# Patient Record
Sex: MALE | Race: WHITE
[De-identification: names, ages, dates, MRNs, and addresses within clinical notes are randomized per-mention and may not be internally consistent; named-entity substitution may affect disease eponyms.]

---

## 2018-02-26 ENCOUNTER — HOSPITAL ENCOUNTER (EMERGENCY)
Dept: HOSPITAL 100 - ED | Age: 48
Discharge: HOME | End: 2018-02-26
Payer: COMMERCIAL

## 2018-02-26 VITALS
DIASTOLIC BLOOD PRESSURE: 98 MMHG | SYSTOLIC BLOOD PRESSURE: 165 MMHG | HEART RATE: 107 BPM | OXYGEN SATURATION: 96 % | TEMPERATURE: 96.62 F | RESPIRATION RATE: 15 BRPM

## 2018-02-26 VITALS — BODY MASS INDEX: 32.3 KG/M2

## 2018-02-26 DIAGNOSIS — S61.411A: Primary | ICD-10-CM
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
## 2018-02-26 DIAGNOSIS — S66.329A: ICD-10-CM
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
## 2018-02-26 PROCEDURE — 99283 EMERGENCY DEPT VISIT LOW MDM: CPT

## 2018-02-26 PROCEDURE — 12001 RPR S/N/AX/GEN/TRNK 2.5CM/<: CPT

## 2018-02-26 PROCEDURE — 29125 APPL SHORT ARM SPLINT STATIC: CPT

## 2019-08-22 ENCOUNTER — HOSPITAL ENCOUNTER (OUTPATIENT)
Dept: HOSPITAL 100 - MFPLAB | Age: 49
Discharge: HOME | End: 2019-08-22
Payer: COMMERCIAL

## 2019-08-22 DIAGNOSIS — G47.30: ICD-10-CM

## 2019-08-22 DIAGNOSIS — E78.5: Primary | ICD-10-CM

## 2019-08-22 LAB
ALANINE AMINOTRANSFER ALT/SGPT: 50 U/L (ref 16–61)
ALBUMIN SERPL-MCNC: 3.8 G/DL (ref 3.2–5)
ALKALINE PHOSPHATASE: 72 U/L (ref 45–117)
ANION GAP: 4 (ref 5–15)
AST(SGOT): 42 U/L (ref 15–37)
BUN SERPL-MCNC: 17 MG/DL (ref 7–18)
BUN/CREAT RATIO: 16.8 RATIO (ref 10–20)
CALCIUM SERPL-MCNC: 8.3 MG/DL (ref 8.5–10.1)
CARBON DIOXIDE: 27 MMOL/L (ref 21–32)
CHLORIDE: 109 MMOL/L (ref 98–107)
CHOLEST SERPL-MCNC: 190 MG/DL
EST GLOM FILT RATE - AFR AMER: 101 ML/MIN (ref 60–?)
GLOBULIN: 3.1 G/DL (ref 2.2–4.2)
GLUCOSE: 94 MG/DL (ref 74–106)
POTASSIUM: 3.9 MMOL/L (ref 3.5–5.1)
TRIGLYCERIDES: 240 MG/DL
VITAMIN D,25 HYDROXY: 14.4 NG/ML (ref 29.95–100.01)
VLDLC SERPL-MCNC: 48 MG/DL (ref 5–40)

## 2019-08-22 PROCEDURE — 80053 COMPREHEN METABOLIC PANEL: CPT

## 2019-08-22 PROCEDURE — 36415 COLL VENOUS BLD VENIPUNCTURE: CPT

## 2019-08-22 PROCEDURE — 82306 VITAMIN D 25 HYDROXY: CPT

## 2019-08-22 PROCEDURE — 80061 LIPID PANEL: CPT

## 2020-04-09 ENCOUNTER — HOSPITAL ENCOUNTER (OUTPATIENT)
Age: 50
End: 2020-04-09
Payer: COMMERCIAL

## 2020-04-09 DIAGNOSIS — R10.12: Primary | ICD-10-CM

## 2020-04-09 LAB
ALANINE AMINOTRANSFER ALT/SGPT: 43 U/L (ref 16–61)
ALBUMIN SERPL-MCNC: 3.8 G/DL (ref 3.2–5)
ALKALINE PHOSPHATASE: 74 U/L (ref 45–117)
ANION GAP: 4 (ref 5–15)
AST(SGOT): 27 U/L (ref 15–37)
BUN SERPL-MCNC: 16 MG/DL (ref 7–18)
BUN/CREAT RATIO: 15.8 RATIO (ref 10–20)
CALCIUM SERPL-MCNC: 8.9 MG/DL (ref 8.5–10.1)
CARBON DIOXIDE: 29 MMOL/L (ref 21–32)
CHLORIDE: 106 MMOL/L (ref 98–107)
DEPRECATED RDW RBC: 41.1 FL (ref 35.1–43.9)
ERYTHROCYTE [DISTWIDTH] IN BLOOD: 13.2 % (ref 11.6–14.6)
EST GLOM FILT RATE - AFR AMER: 100 ML/MIN (ref 60–?)
GLOBULIN: 3.5 G/DL (ref 2.2–4.2)
GLUCOSE: 91 MG/DL (ref 74–106)
HCT VFR BLD AUTO: 44 % (ref 40–54)
HEMOGLOBIN: 14.2 G/DL (ref 13–16.5)
HGB BLD-MCNC: 14.2 G/DL (ref 13–16.5)
IMMATURE GRANULOCYTES COUNT: 0.01 X10^3/UL (ref 0–0)
LIPASE: 55 U/L (ref 73–393)
MCV RBC: 84.9 FL (ref 80–94)
MEAN CORP HGB CONC: 32.3 G/DL (ref 32–36)
MEAN PLATELET VOL.: 9.9 FL (ref 6.2–12)
NRBC FLAGGED BY ANALYZER: 0 % (ref 0–5)
PLATELET # BLD: 182 K/MM3 (ref 150–450)
PLATELET COUNT: 182 K/MM3 (ref 150–450)
POTASSIUM: 3.9 MMOL/L (ref 3.5–5.1)
RBC # BLD AUTO: 5.18 M/MM3 (ref 4.6–6.2)
RBC DISTRIBUTION WIDTH CV: 13.2 % (ref 11.6–14.6)
RBC DISTRIBUTION WIDTH SD: 41.1 FL (ref 35.1–43.9)
WBC # BLD AUTO: 6.1 K/MM3 (ref 4.4–11)
WHITE BLOOD COUNT: 6.1 K/MM3 (ref 4.4–11)

## 2020-04-09 PROCEDURE — 85025 COMPLETE CBC W/AUTO DIFF WBC: CPT

## 2020-04-09 PROCEDURE — 83690 ASSAY OF LIPASE: CPT

## 2020-04-09 PROCEDURE — 74160 CT ABDOMEN W/CONTRAST: CPT

## 2020-04-09 PROCEDURE — 80053 COMPREHEN METABOLIC PANEL: CPT

## 2020-04-09 PROCEDURE — 36415 COLL VENOUS BLD VENIPUNCTURE: CPT

## 2020-11-18 ENCOUNTER — HOSPITAL ENCOUNTER (OUTPATIENT)
Age: 50
Discharge: HOME | End: 2020-11-18
Payer: COMMERCIAL

## 2020-11-18 VITALS — BODY MASS INDEX: 32.3 KG/M2

## 2020-11-18 DIAGNOSIS — Z20.828: Primary | ICD-10-CM

## 2020-11-18 PROCEDURE — 87635 SARS-COV-2 COVID-19 AMP PRB: CPT

## 2020-11-18 PROCEDURE — U0003 INFECTIOUS AGENT DETECTION BY NUCLEIC ACID (DNA OR RNA); SEVERE ACUTE RESPIRATORY SYNDROME CORONAVIRUS 2 (SARS-COV-2) (CORONAVIRUS DISEASE [COVID-19]), AMPLIFIED PROBE TECHNIQUE, MAKING USE OF HIGH THROUGHPUT TECHNOLOGIES AS DESCRIBED BY CMS-2020-01-R: HCPCS

## 2020-12-17 ENCOUNTER — HOSPITAL ENCOUNTER (OUTPATIENT)
Age: 50
End: 2020-12-17
Payer: COMMERCIAL

## 2020-12-17 VITALS — BODY MASS INDEX: 32.3 KG/M2

## 2020-12-17 DIAGNOSIS — E55.9: Primary | ICD-10-CM

## 2020-12-17 DIAGNOSIS — E78.5: ICD-10-CM

## 2020-12-17 LAB
ANION GAP: 6 (ref 5–15)
BUN SERPL-MCNC: 18 MG/DL (ref 7–18)
BUN/CREAT RATIO: 18 RATIO (ref 10–20)
CALCIUM SERPL-MCNC: 8.6 MG/DL (ref 8.5–10.1)
CARBON DIOXIDE: 29 MMOL/L (ref 21–32)
CHLORIDE: 103 MMOL/L (ref 98–107)
CHOLEST SERPL-MCNC: 190 MG/DL
EST GLOM FILT RATE - AFR AMER: 101 ML/MIN (ref 60–?)
GLUCOSE: 86 MG/DL (ref 74–106)
POTASSIUM: 4.2 MMOL/L (ref 3.5–5.1)
TRIGLYCERIDES: 166 MG/DL
VITAMIN D,25 HYDROXY: 18.1 NG/ML
VLDLC SERPL-MCNC: 33 MG/DL (ref 5–40)

## 2020-12-17 PROCEDURE — 80048 BASIC METABOLIC PNL TOTAL CA: CPT

## 2020-12-17 PROCEDURE — 36415 COLL VENOUS BLD VENIPUNCTURE: CPT

## 2020-12-17 PROCEDURE — 82306 VITAMIN D 25 HYDROXY: CPT

## 2020-12-17 PROCEDURE — 80061 LIPID PANEL: CPT

## 2021-07-16 ENCOUNTER — HOSPITAL ENCOUNTER (EMERGENCY)
Age: 51
Discharge: ANOTHER HOSPITAL | End: 2021-07-16
Attending: EMERGENCY MEDICINE
Payer: OTHER MISCELLANEOUS

## 2021-07-16 ENCOUNTER — APPOINTMENT (OUTPATIENT)
Dept: CT IMAGING | Age: 51
End: 2021-07-16
Payer: OTHER MISCELLANEOUS

## 2021-07-16 ENCOUNTER — HOSPITAL ENCOUNTER (INPATIENT)
Age: 51
LOS: 1 days | Discharge: HOME | DRG: 086 | End: 2021-07-17
Attending: SURGERY | Admitting: SURGERY
Payer: COMMERCIAL

## 2021-07-16 ENCOUNTER — APPOINTMENT (OUTPATIENT)
Dept: CT IMAGING | Age: 51
DRG: 086 | End: 2021-07-16
Attending: SURGERY
Payer: COMMERCIAL

## 2021-07-16 ENCOUNTER — APPOINTMENT (OUTPATIENT)
Dept: GENERAL RADIOLOGY | Age: 51
End: 2021-07-16
Payer: OTHER MISCELLANEOUS

## 2021-07-16 ENCOUNTER — APPOINTMENT (OUTPATIENT)
Dept: GENERAL RADIOLOGY | Age: 51
DRG: 086 | End: 2021-07-16
Attending: SURGERY
Payer: COMMERCIAL

## 2021-07-16 VITALS
HEART RATE: 83 BPM | BODY MASS INDEX: 34.3 KG/M2 | HEIGHT: 71 IN | TEMPERATURE: 99.3 F | SYSTOLIC BLOOD PRESSURE: 126 MMHG | OXYGEN SATURATION: 94 % | WEIGHT: 245 LBS | DIASTOLIC BLOOD PRESSURE: 83 MMHG | RESPIRATION RATE: 16 BRPM

## 2021-07-16 DIAGNOSIS — M25.561 ACUTE PAIN OF RIGHT KNEE: ICD-10-CM

## 2021-07-16 DIAGNOSIS — S20.211A CONTUSION OF RIGHT CHEST WALL, INITIAL ENCOUNTER: ICD-10-CM
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
## 2021-07-16 DIAGNOSIS — I62.9 ACUTE INTRA-CRANIAL HEMORRHAGE (HCC): ICD-10-CM

## 2021-07-16 PROBLEM — I61.9 INTRAPARENCHYMAL HEMORRHAGE OF BRAIN (HCC): Status: ACTIVE | Noted: 2021-07-16

## 2021-07-16 LAB
ALBUMIN SERPL-MCNC: 4.9 G/DL (ref 3.5–4.6)
ALP BLD-CCNC: 72 U/L (ref 35–104)
ALT SERPL-CCNC: 45 U/L (ref 0–41)
ANION GAP SERPL CALCULATED.3IONS-SCNC: 11 MEQ/L (ref 9–15)
AST SERPL-CCNC: 49 U/L (ref 0–40)
BASOPHILS ABSOLUTE: 0 K/UL (ref 0–0.1)
BASOPHILS RELATIVE PERCENT: 0.2 % (ref 0.2–1.2)
BILIRUB SERPL-MCNC: 0.6 MG/DL (ref 0.2–0.7)
BUN BLDV-MCNC: 14 MG/DL (ref 6–20)
CALCIUM SERPL-MCNC: 9.1 MG/DL (ref 8.5–9.9)
CHLORIDE BLD-SCNC: 101 MEQ/L (ref 95–107)
CO2: 25 MEQ/L (ref 20–31)
CREAT SERPL-MCNC: 0.59 MG/DL (ref 0.7–1.2)
EOSINOPHILS ABSOLUTE: 0 K/UL (ref 0–0.5)
EOSINOPHILS RELATIVE PERCENT: 0.2 % (ref 0.8–7)
GFR AFRICAN AMERICAN: >60
GFR NON-AFRICAN AMERICAN: >60
GLOBULIN: 2.7 G/DL (ref 2.3–3.5)
GLUCOSE BLD-MCNC: 122 MG/DL (ref 70–99)
HCT VFR BLD CALC: 42.9 % (ref 42–52)
HEMOGLOBIN: 14.1 G/DL (ref 13.7–17.5)
IMMATURE GRANULOCYTES #: 0 K/UL
IMMATURE GRANULOCYTES %: 0.4 %
INR BLD: 1
LYMPHOCYTES ABSOLUTE: 0.9 K/UL (ref 1.3–3.6)
LYMPHOCYTES RELATIVE PERCENT: 9.2 %
MCH RBC QN AUTO: 28 PG (ref 25.7–32.2)
MCHC RBC AUTO-ENTMCNC: 32.9 % (ref 32.3–36.5)
MCV RBC AUTO: 85.1 FL (ref 79–92.2)
MONOCYTES ABSOLUTE: 0.9 K/UL (ref 0.3–0.8)
MONOCYTES RELATIVE PERCENT: 9 % (ref 5.3–12.2)
NEUTROPHILS ABSOLUTE: 7.9 K/UL (ref 1.8–5.4)
NEUTROPHILS RELATIVE PERCENT: 81 % (ref 34–67.9)
PDW BLD-RTO: 13.3 % (ref 11.6–14.4)
PLATELET # BLD: 155 K/UL (ref 163–337)
POTASSIUM SERPL-SCNC: 4.2 MEQ/L (ref 3.4–4.9)
PROTHROMBIN TIME: 13.3 SEC (ref 12.3–14.9)
RBC # BLD: 5.04 M/UL (ref 4.63–6.08)
SODIUM BLD-SCNC: 137 MEQ/L (ref 135–144)
TOTAL PROTEIN: 7.6 G/DL (ref 6.3–8)
TROPONIN: <0.01 NG/ML (ref 0–0.01)
WBC # BLD: 9.8 K/UL (ref 4.2–9)

## 2021-07-16 PROCEDURE — 6370000000 HC RX 637 (ALT 250 FOR IP): Performed by: EMERGENCY MEDICINE

## 2021-07-16 PROCEDURE — 85025 COMPLETE CBC W/AUTO DIFF WBC: CPT

## 2021-07-16 PROCEDURE — 71046 X-RAY EXAM CHEST 2 VIEWS: CPT

## 2021-07-16 PROCEDURE — 2580000003 HC RX 258: Performed by: PHYSICIAN ASSISTANT

## 2021-07-16 PROCEDURE — 2000000000 HC ICU R&B

## 2021-07-16 PROCEDURE — 96125 COGNITIVE TEST BY HC PRO: CPT

## 2021-07-16 PROCEDURE — 73562 X-RAY EXAM OF KNEE 3: CPT

## 2021-07-16 PROCEDURE — 71250 CT THORAX DX C-: CPT

## 2021-07-16 PROCEDURE — 99285 EMERGENCY DEPT VISIT HI MDM: CPT

## 2021-07-16 PROCEDURE — 94150 VITAL CAPACITY TEST: CPT

## 2021-07-16 PROCEDURE — 72125 CT NECK SPINE W/O DYE: CPT

## 2021-07-16 PROCEDURE — 36415 COLL VENOUS BLD VENIPUNCTURE: CPT

## 2021-07-16 PROCEDURE — 94660 CPAP INITIATION&MGMT: CPT

## 2021-07-16 PROCEDURE — 93005 ELECTROCARDIOGRAM TRACING: CPT | Performed by: PHYSICIAN ASSISTANT

## 2021-07-16 PROCEDURE — 73630 X-RAY EXAM OF FOOT: CPT

## 2021-07-16 PROCEDURE — 84484 ASSAY OF TROPONIN QUANT: CPT

## 2021-07-16 PROCEDURE — 85610 PROTHROMBIN TIME: CPT

## 2021-07-16 PROCEDURE — 80053 COMPREHEN METABOLIC PANEL: CPT

## 2021-07-16 PROCEDURE — APPSS30 APP SPLIT SHARED TIME 16-30 MINUTES: Performed by: PHYSICIAN ASSISTANT

## 2021-07-16 PROCEDURE — 70450 CT HEAD/BRAIN W/O DYE: CPT

## 2021-07-16 PROCEDURE — 6370000000 HC RX 637 (ALT 250 FOR IP): Performed by: PHYSICIAN ASSISTANT

## 2021-07-16 PROCEDURE — 2500000003 HC RX 250 WO HCPCS: Performed by: EMERGENCY MEDICINE

## 2021-07-16 PROCEDURE — 96374 THER/PROPH/DIAG INJ IV PUSH: CPT

## 2021-07-16 RX ORDER — SODIUM CHLORIDE 0.9 % (FLUSH) 0.9 %
5-40 SYRINGE (ML) INJECTION PRN
Status: DISCONTINUED | OUTPATIENT
Start: 2021-07-16 | End: 2021-07-17 | Stop reason: HOSPADM

## 2021-07-16 RX ORDER — PAROXETINE 10 MG/1
10 TABLET, FILM COATED ORAL EVERY MORNING
COMMUNITY

## 2021-07-16 RX ORDER — SODIUM CHLORIDE 9 MG/ML
INJECTION, SOLUTION INTRAVENOUS CONTINUOUS
Status: DISCONTINUED | OUTPATIENT
Start: 2021-07-16 | End: 2021-07-16

## 2021-07-16 RX ORDER — ONDANSETRON 4 MG/1
4 TABLET, ORALLY DISINTEGRATING ORAL EVERY 8 HOURS PRN
Status: DISCONTINUED | OUTPATIENT
Start: 2021-07-16 | End: 2021-07-17 | Stop reason: HOSPADM

## 2021-07-16 RX ORDER — LEVETIRACETAM 250 MG/1
750 TABLET ORAL 2 TIMES DAILY
Status: DISCONTINUED | OUTPATIENT
Start: 2021-07-16 | End: 2021-07-17 | Stop reason: HOSPADM

## 2021-07-16 RX ORDER — ONDANSETRON 2 MG/ML
4 INJECTION INTRAMUSCULAR; INTRAVENOUS EVERY 6 HOURS PRN
Status: DISCONTINUED | OUTPATIENT
Start: 2021-07-16 | End: 2021-07-17 | Stop reason: HOSPADM

## 2021-07-16 RX ORDER — ACETAMINOPHEN 325 MG/1
650 TABLET ORAL EVERY 6 HOURS
Status: DISCONTINUED | OUTPATIENT
Start: 2021-07-16 | End: 2021-07-17 | Stop reason: HOSPADM

## 2021-07-16 RX ORDER — PAROXETINE 10 MG/1
10 TABLET, FILM COATED ORAL EVERY MORNING
Status: DISCONTINUED | OUTPATIENT
Start: 2021-07-16 | End: 2021-07-17 | Stop reason: HOSPADM

## 2021-07-16 RX ORDER — LISINOPRIL 10 MG/1
10 TABLET ORAL DAILY
COMMUNITY

## 2021-07-16 RX ORDER — LISINOPRIL 10 MG/1
10 TABLET ORAL DAILY
Status: DISCONTINUED | OUTPATIENT
Start: 2021-07-16 | End: 2021-07-17 | Stop reason: HOSPADM

## 2021-07-16 RX ORDER — POLYETHYLENE GLYCOL 3350 17 G/17G
17 POWDER, FOR SOLUTION ORAL DAILY
Status: DISCONTINUED | OUTPATIENT
Start: 2021-07-16 | End: 2021-07-17 | Stop reason: HOSPADM

## 2021-07-16 RX ORDER — DIAPER,BRIEF,INFANT-TODD,DISP
EACH MISCELLANEOUS ONCE
Status: DISCONTINUED | OUTPATIENT
Start: 2021-07-16 | End: 2021-07-16

## 2021-07-16 RX ORDER — OXYCODONE HYDROCHLORIDE 5 MG/1
5 TABLET ORAL EVERY 4 HOURS PRN
Status: DISCONTINUED | OUTPATIENT
Start: 2021-07-16 | End: 2021-07-17 | Stop reason: HOSPADM

## 2021-07-16 RX ORDER — SODIUM CHLORIDE 0.9 % (FLUSH) 0.9 %
5-40 SYRINGE (ML) INJECTION EVERY 12 HOURS SCHEDULED
Status: DISCONTINUED | OUTPATIENT
Start: 2021-07-16 | End: 2021-07-17 | Stop reason: HOSPADM

## 2021-07-16 RX ORDER — ROSUVASTATIN CALCIUM 10 MG/1
10 TABLET, COATED ORAL DAILY
COMMUNITY

## 2021-07-16 RX ORDER — SODIUM PHOSPHATE, DIBASIC AND SODIUM PHOSPHATE, MONOBASIC 7; 19 G/133ML; G/133ML
1 ENEMA RECTAL DAILY PRN
Status: DISCONTINUED | OUTPATIENT
Start: 2021-07-16 | End: 2021-07-17 | Stop reason: HOSPADM

## 2021-07-16 RX ORDER — TRAMADOL HYDROCHLORIDE 50 MG/1
50 TABLET ORAL ONCE
Status: COMPLETED | OUTPATIENT
Start: 2021-07-16 | End: 2021-07-16

## 2021-07-16 RX ORDER — MAGNESIUM SULFATE IN WATER 40 MG/ML
2000 INJECTION, SOLUTION INTRAVENOUS PRN
Status: DISCONTINUED | OUTPATIENT
Start: 2021-07-16 | End: 2021-07-17 | Stop reason: HOSPADM

## 2021-07-16 RX ORDER — BISACODYL 10 MG
10 SUPPOSITORY, RECTAL RECTAL DAILY
Status: DISCONTINUED | OUTPATIENT
Start: 2021-07-16 | End: 2021-07-17 | Stop reason: HOSPADM

## 2021-07-16 RX ORDER — LABETALOL HYDROCHLORIDE 5 MG/ML
10 INJECTION, SOLUTION INTRAVENOUS EVERY 4 HOURS PRN
Status: DISCONTINUED | OUTPATIENT
Start: 2021-07-16 | End: 2021-07-17 | Stop reason: HOSPADM

## 2021-07-16 RX ORDER — SODIUM CHLORIDE 9 MG/ML
25 INJECTION, SOLUTION INTRAVENOUS PRN
Status: DISCONTINUED | OUTPATIENT
Start: 2021-07-16 | End: 2021-07-17 | Stop reason: HOSPADM

## 2021-07-16 RX ORDER — LABETALOL HYDROCHLORIDE 5 MG/ML
5 INJECTION, SOLUTION INTRAVENOUS ONCE
Status: COMPLETED | OUTPATIENT
Start: 2021-07-16 | End: 2021-07-16

## 2021-07-16 RX ORDER — POTASSIUM CHLORIDE 7.45 MG/ML
10 INJECTION INTRAVENOUS PRN
Status: DISCONTINUED | OUTPATIENT
Start: 2021-07-16 | End: 2021-07-17 | Stop reason: HOSPADM

## 2021-07-16 RX ADMIN — PAROXETINE 10 MG: 10 TABLET, FILM COATED ORAL at 14:13

## 2021-07-16 RX ADMIN — ACETAMINOPHEN 650 MG: 325 TABLET ORAL at 13:37

## 2021-07-16 RX ADMIN — SODIUM CHLORIDE, PRESERVATIVE FREE 10 ML: 5 INJECTION INTRAVENOUS at 20:00

## 2021-07-16 RX ADMIN — LISINOPRIL 10 MG: 10 TABLET ORAL at 13:38

## 2021-07-16 RX ADMIN — POLYETHYLENE GLYCOL 3350 17 G: 17 POWDER, FOR SOLUTION ORAL at 13:37

## 2021-07-16 RX ADMIN — TRAMADOL HYDROCHLORIDE 50 MG: 50 TABLET, FILM COATED ORAL at 09:52

## 2021-07-16 RX ADMIN — SODIUM CHLORIDE: 9 INJECTION, SOLUTION INTRAVENOUS at 13:38

## 2021-07-16 RX ADMIN — LEVETIRACETAM 750 MG: 250 TABLET ORAL at 20:00

## 2021-07-16 RX ADMIN — ACETAMINOPHEN 650 MG: 325 TABLET ORAL at 19:53

## 2021-07-16 RX ADMIN — LABETALOL HYDROCHLORIDE 5 MG: 5 INJECTION, SOLUTION INTRAVENOUS at 10:52

## 2021-07-16 RX ADMIN — OXYCODONE 5 MG: 5 TABLET ORAL at 13:38

## 2021-07-16 RX ADMIN — OXYCODONE 5 MG: 5 TABLET ORAL at 19:53

## 2021-07-16 ASSESSMENT — PAIN SCALES - GENERAL
PAINLEVEL_OUTOF10: 2
PAINLEVEL_OUTOF10: 6
PAINLEVEL_OUTOF10: 2
PAINLEVEL_OUTOF10: 7
PAINLEVEL_OUTOF10: 4
PAINLEVEL_OUTOF10: 6
PAINLEVEL_OUTOF10: 6
PAINLEVEL_OUTOF10: 4
PAINLEVEL_OUTOF10: 2

## 2021-07-16 ASSESSMENT — PAIN DESCRIPTION - FREQUENCY
FREQUENCY: CONTINUOUS

## 2021-07-16 ASSESSMENT — ENCOUNTER SYMPTOMS
FACIAL SWELLING: 0
BACK PAIN: 0
TROUBLE SWALLOWING: 0
EYE DISCHARGE: 0
SHORTNESS OF BREATH: 0
STRIDOR: 0
CHOKING: 0
EYE PAIN: 0
VOICE CHANGE: 0
CHEST TIGHTNESS: 0
COUGH: 0
DIARRHEA: 0
VOMITING: 0
SINUS PRESSURE: 0
BLOOD IN STOOL: 0
CONSTIPATION: 0
WHEEZING: 0
EYE REDNESS: 0
SORE THROAT: 0
ABDOMINAL PAIN: 0

## 2021-07-16 ASSESSMENT — PAIN DESCRIPTION - ORIENTATION
ORIENTATION: RIGHT

## 2021-07-16 ASSESSMENT — PAIN DESCRIPTION - DESCRIPTORS
DESCRIPTORS: DULL
DESCRIPTORS: DULL
DESCRIPTORS: ACHING
DESCRIPTORS: DULL

## 2021-07-16 ASSESSMENT — PAIN DESCRIPTION - LOCATION
LOCATION: OTHER (COMMENT)
LOCATION: CHEST
LOCATION: CHEST;KNEE
LOCATION: CHEST
LOCATION: CHEST

## 2021-07-16 ASSESSMENT — PAIN DESCRIPTION - PAIN TYPE
TYPE: ACUTE PAIN
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
## 2021-07-16 NOTE — ED NOTES
Lucy from transfer center called with bed assignment [facility], [id number].   Jean Claude Richey Nones  07/16/21 1127

## 2021-07-16 NOTE — LETTER
07/17/21         RE: Candido Child       To Whom it May Concern:    [address] [place] [address] is currently treating patient Candido Patel. It is my professional judgement that he may be excused from work obligations for until after his follow up appointment with his Neurosurgeon, Dr. Wood. Candido Child has requested and authorized me to send this letter to you on his behalf.        Sincerely,        Mikhail Friedman MD    Trauma/Critical Care  Emergency General Surgery

## 2021-07-16 NOTE — ED NOTES
Pt report is given to Gera Harding, at [facility]. Pt transported to [facility], ICU bed 9. Pt is transported via [address].       Beulah Longoria RN  07/16/21 113

## 2021-07-16 NOTE — PROGRESS NOTES
[facility] EMERGENCY [facility] AT [place]   Facility/Department: [facility]  Speech Language Pathology  Initial Speech/Language/Cognitive Assessment    NAME:Jeb Jones  : 1970 (59 y.o.)   MRN: [id number]  ROOM: [place] [id number]  ADMISSION DATE: 2021  PATIENT DIAGNOSIS(ES): Acute intracranial hemorrhage  No chief complaint on file. Patient Active Problem List    Diagnosis Date Noted    Hyperopia with astigmatism and presbyopia 2015     Past Medical History:   Diagnosis Date    Depression     Hyperlipidemia     Hypertension      Past Surgical History:   Procedure Laterality Date    SHOULDER SURGERY Left     VASECTOMY           DATE ONSET: 7/15/21    Date of Evaluation: 2021   Evaluating Therapist: MONTANA Concepcion      Assessment:      Diagnosis: Pt p/w speech/language/cognition WFL and score WFL on RIPA. Speech therapy services aren't warranted at this time. Recommendations:  Requires SLP Intervention: No  Duration/Frequency of Treatment: No f/u needed  D/C Recommendations: To be determined          Goals:  Short-term Goals  Goal 1: N/A  Long-term Goals  Goal 1: N/A   Patient's goals: To go home    Subjective:   Previous level of function and limitations: Pt is independent   General  Chart Reviewed: Yes  Patient assessed for rehabilitation services?: Yes  Family / Caregiver Present: Yes (spouse and dtr)  Social/Functional History  Lives With: Spouse  Ambulation Assistance: Independent  Transfer Assistance: Independent  Active : Yes  Occupation: Full time employment  Type of occupation:   Leisure & Hobbies: hunting/camping/fishing  Vision  Vision: Impaired  Vision Exceptions: Wears glasses for reading  Hearing  Hearing: Within functional limits           Objective:     Oral/Motor  Oral Motor:  Within functional limits    Auditory Comprehension  Comprehension: Within Functional Limits         Expression  Primary Mode of Expression: Verbal    Verbal Expression  Verbal Expression: Within functional limits         Motor Speech  Motor Speech: Within Functional Limits         Cognition:      Orientation  Overall Orientation Status: Within Normal Limits  Attention  Attention: Within Functional Limits  Memory  Memory: Within Funtional Limits (recalled 3/3 words on RIPA and scored 28/30 on RIPA immediate memory)  Problem Solving  Problem Solving: Within Functional Limits (verbal basic problem solving and reasoning WFL on RIPA)  Safety/Judgement  Safety/Judgement: Within Functional Limits    Additional Assessments:       Portions of the RIPA-2 [facility] Information Processing Assessment-2nd Edition ) were administered. Scores are as follows:  Subtest: Raw Score Standard Score   I. Immediate Memory 28 15   II. Recent Memory 30 15   III. Temporal Orientation 30 14   VIII. Problem Solving and          Abstract Reasoning 30 16          Prognosis:  Speech Therapy Prognosis  Prognosis: Good  Individuals consulted  Consulted and agree with results and recommendations: Patient; Family member  Family member consulted: spouse    Education:  Patient Education: pt educated on results of testing  Patient Education Response: Verbalizes understanding  Safety Devices in place: Yes  Type of devices: Bed alarm in place;Call light within reach    Pain Assessment:  Pre-Treatment  Pain assessment: 0-10  Pain level: 0  Intervention:  Patient denies pain. Post-Treatment  Pain assessment: 0-10  Pain level: 0  Intervention:  Patient denies pain.       Therapy Time  SLP Individual Minutes  Time In: 6929  Time Out: 8867  Minutes: 13                 Signature: Electronically signed by MONTANA Hurt Mai on 7/16/2021 at 2:59 PM
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
## 2021-07-16 NOTE — ED NOTES
Called transfer center, talked to Anna Julian, she will call back with bed assignment.   Shaun Riley  07/16/21 1057

## 2021-07-16 NOTE — PROGRESS NOTES
Spiritual Care Services     Summary of Visit:      Spiritual Assessment/Intervention/Outcomes:    Encounter Summary  Services provided to[de-identified] Patient  Referral/Consult From[de-identified] Rounding  Support System: Spouse, Children, Family members  Continue Visiting: No  Complexity of Encounter: Moderate  Length of Encounter: 15 minutes  Spiritual Assessment Completed: Yes  Routine  Type: Initial     Spiritual/Hindu  Type: Spiritual support  Assessment: Calm, Approachable  Intervention: Active listening, Explored feelings, thoughts, concerns, Facilitated forgiveness  Outcome: Shared life review, Expressed feelings/needs/concerns                    Values / Beliefs  Do you have any ethnic, cultural, sacramental, or spiritual Anabaptist needs you would like us to be aware of while you are in the hospital?: No    Care Plan:        [id number]   Electronically signed by Andra Lovelace on 7/16/21 at 7:42 PM EDT     To reach a  for emotional and spiritual support, place an [facility] consult request.   If a  is needed immediately, dial 0 and ask to page the on-call .
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
## 2021-07-17 ENCOUNTER — APPOINTMENT (OUTPATIENT)
Dept: CT IMAGING | Age: 51
DRG: 086 | End: 2021-07-17
Attending: SURGERY
Payer: COMMERCIAL

## 2021-07-17 VITALS
DIASTOLIC BLOOD PRESSURE: 72 MMHG | WEIGHT: 245 LBS | SYSTOLIC BLOOD PRESSURE: 122 MMHG | RESPIRATION RATE: 16 BRPM | BODY MASS INDEX: 34.3 KG/M2 | HEIGHT: 71 IN | HEART RATE: 70 BPM | TEMPERATURE: 98.4 F | OXYGEN SATURATION: 96 %

## 2021-07-17 PROBLEM — D62 ACUTE BLOOD LOSS ANEMIA: Status: ACTIVE | Noted: 2021-07-17

## 2021-07-17 PROBLEM — S22.059A CLOSED FRACTURE OF SIXTH THORACIC VERTEBRA (HCC): Status: ACTIVE | Noted: 2021-07-17
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
## 2021-07-17 PROBLEM — I60.9 SUBARACHNOID BLEED (HCC): Status: ACTIVE | Noted: 2021-07-17

## 2021-07-17 PROBLEM — G89.11 ACUTE TRAUMATIC PAIN: Status: ACTIVE | Noted: 2021-07-17

## 2021-07-17 PROBLEM — M25.561 RIGHT KNEE PAIN: Status: ACTIVE | Noted: 2021-07-17

## 2021-07-17 PROBLEM — S22.071A: Status: ACTIVE | Noted: 2021-07-17

## 2021-07-17 LAB
ANION GAP SERPL CALCULATED.3IONS-SCNC: 9 MEQ/L (ref 9–15)
BUN BLDV-MCNC: 15 MG/DL (ref 6–20)
CALCIUM SERPL-MCNC: 9.2 MG/DL (ref 8.5–9.9)
CHLORIDE BLD-SCNC: 101 MEQ/L (ref 95–107)
CO2: 27 MEQ/L (ref 20–31)
CREAT SERPL-MCNC: 1.11 MG/DL (ref 0.7–1.2)
GFR AFRICAN AMERICAN: >60
GFR NON-AFRICAN AMERICAN: >60
GLUCOSE BLD-MCNC: 102 MG/DL (ref 70–99)
HCT VFR BLD CALC: 40.1 % (ref 42–52)
HEMOGLOBIN: 13.4 G/DL (ref 14–18)
MAGNESIUM: 2.1 MG/DL (ref 1.7–2.4)
MCH RBC QN AUTO: 28.5 PG (ref 27–31.3)
MCHC RBC AUTO-ENTMCNC: 33.4 % (ref 33–37)
MCV RBC AUTO: 85.3 FL (ref 80–100)
PDW BLD-RTO: 13.9 % (ref 11.5–14.5)
PLATELET # BLD: 140 K/UL (ref 130–400)
POTASSIUM REFLEX MAGNESIUM: 4.6 MEQ/L (ref 3.4–4.9)
RBC # BLD: 4.7 M/UL (ref 4.7–6.1)
SODIUM BLD-SCNC: 137 MEQ/L (ref 135–144)
WBC # BLD: 6.8 K/UL (ref 4.8–10.8)

## 2021-07-17 PROCEDURE — 99239 HOSP IP/OBS DSCHRG MGMT >30: CPT | Performed by: SURGERY

## 2021-07-17 PROCEDURE — 97165 OT EVAL LOW COMPLEX 30 MIN: CPT

## 2021-07-17 PROCEDURE — 83735 ASSAY OF MAGNESIUM: CPT

## 2021-07-17 PROCEDURE — 85027 COMPLETE CBC AUTOMATED: CPT

## 2021-07-17 PROCEDURE — 80048 BASIC METABOLIC PNL TOTAL CA: CPT

## 2021-07-17 PROCEDURE — 2580000003 HC RX 258: Performed by: PHYSICIAN ASSISTANT

## 2021-07-17 PROCEDURE — 6370000000 HC RX 637 (ALT 250 FOR IP): Performed by: PHYSICIAN ASSISTANT

## 2021-07-17 PROCEDURE — 36415 COLL VENOUS BLD VENIPUNCTURE: CPT

## 2021-07-17 PROCEDURE — 70450 CT HEAD/BRAIN W/O DYE: CPT

## 2021-07-17 PROCEDURE — 97161 PT EVAL LOW COMPLEX 20 MIN: CPT

## 2021-07-17 RX ORDER — ACETAMINOPHEN 325 MG/1
650 TABLET ORAL EVERY 4 HOURS PRN
Qty: 120 TABLET | Refills: 0 | Status: SHIPPED | OUTPATIENT
Start: 2021-07-17

## 2021-07-17 RX ORDER — LEVETIRACETAM 750 MG/1
750 TABLET ORAL 2 TIMES DAILY
Qty: 12 TABLET | Refills: 0 | Status: SHIPPED | OUTPATIENT
Start: 2021-07-17 | End: 2021-07-23

## 2021-07-17 RX ADMIN — ACETAMINOPHEN 650 MG: 325 TABLET ORAL at 01:54

## 2021-07-17 RX ADMIN — PAROXETINE 10 MG: 10 TABLET, FILM COATED ORAL at 07:55

## 2021-07-17 RX ADMIN — ACETAMINOPHEN 650 MG: 325 TABLET ORAL at 07:55

## 2021-07-17 RX ADMIN — OXYCODONE 5 MG: 5 TABLET ORAL at 01:54

## 2021-07-17 RX ADMIN — LISINOPRIL 10 MG: 10 TABLET ORAL at 07:54

## 2021-07-17 RX ADMIN — SODIUM CHLORIDE, PRESERVATIVE FREE 10 ML: 5 INJECTION INTRAVENOUS at 11:52

## 2021-07-17 RX ADMIN — LEVETIRACETAM 750 MG: 250 TABLET ORAL at 07:55

## 2021-07-17 RX ADMIN — OXYCODONE 5 MG: 5 TABLET ORAL at 11:52

## 2021-07-17 ASSESSMENT — PAIN SCALES - GENERAL
PAINLEVEL_OUTOF10: 2
PAINLEVEL_OUTOF10: 3
PAINLEVEL_OUTOF10: 6
PAINLEVEL_OUTOF10: 5
PAINLEVEL_OUTOF10: 3
PAINLEVEL_OUTOF10: 7

## 2021-07-17 ASSESSMENT — PAIN DESCRIPTION - DESCRIPTORS
DESCRIPTORS: SORE;DISCOMFORT
DESCRIPTORS: SORE
DESCRIPTORS: PRESSURE;TIGHTNESS;SORE
DESCRIPTORS: SORE

## 2021-07-17 ASSESSMENT — PAIN DESCRIPTION - PROGRESSION
CLINICAL_PROGRESSION: GRADUALLY IMPROVING

## 2021-07-17 ASSESSMENT — PAIN DESCRIPTION - PAIN TYPE
TYPE: ACUTE PAIN

## 2021-07-17 ASSESSMENT — PAIN DESCRIPTION - ORIENTATION
ORIENTATION: RIGHT;LEFT
ORIENTATION: RIGHT
ORIENTATION: RIGHT;MID
ORIENTATION: RIGHT

## 2021-07-17 ASSESSMENT — PAIN DESCRIPTION - LOCATION
LOCATION: KNEE
LOCATION: FOOT;KNEE
LOCATION: CHEST;RIB CAGE
LOCATION: KNEE

## 2021-07-17 ASSESSMENT — PAIN DESCRIPTION - FREQUENCY
FREQUENCY: INTERMITTENT
FREQUENCY: OTHER (COMMENT)
FREQUENCY: INTERMITTENT

## 2021-07-17 ASSESSMENT — PAIN DESCRIPTION - ONSET
ONSET: SUDDEN
ONSET: ON-GOING
ONSET: GRADUAL

## 2021-07-17 ASSESSMENT — PAIN - FUNCTIONAL ASSESSMENT
PAIN_FUNCTIONAL_ASSESSMENT: PREVENTS OR INTERFERES SOME ACTIVE ACTIVITIES AND ADLS
PAIN_FUNCTIONAL_ASSESSMENT: PREVENTS OR INTERFERES SOME ACTIVE ACTIVITIES AND ADLS
PAIN_FUNCTIONAL_ASSESSMENT: ACTIVITIES ARE NOT PREVENTED
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
## 2021-07-17 NOTE — H&P
External exam: no crepitus or pain with palpation, no contusions or abrasions; and Lung exam: breath sounds clear, no wheezes, no rales  Cardiovascular:    Pulses: Bilateral radial, femoral, DP and PT pulses are normal;  Abdomen: Appearance: Non-distended, No scars, lacerations, contusions; Rectal: Not performed  Pelvis/Perineum: Pelvis is stable to palpation;  Musculoskeletal:    Back/Spine: Thoracolumbar spinal column non-tender; no step off or deformity; Extremities: Right knee TTP and ecchymosis to left great toe. Otherwise, extremities are atraumatic. PAST MEDICAL HISTORY:  Past Medical History:   Diagnosis Date    Depression     Hyperlipidemia     Hypertension        PAST SURGICAL HISTORY:  Past Surgical History:   Procedure Laterality Date    SHOULDER SURGERY Left     VASECTOMY         PRE-ADMISSION MEDICATIONS:   Prior to Admission medications    Medication Sig Start Date End Date Taking?  Authorizing Provider   rosuvastatin (CRESTOR) 10 MG tablet Take 10 mg by mouth daily   Yes Historical Provider, MD   PARoxetine (PAXIL) 10 MG tablet Take 10 mg by mouth every morning   Yes Historical Provider, MD   lisinopril (PRINIVIL;ZESTRIL) 10 MG tablet Take 10 mg by mouth daily   Yes Historical Provider, MD       ALLERGIES:  Amoxicillin, Azithromycin, and Pcn [penicillins]    SOCIAL HISTORY:   Social History     Socioeconomic History    Marital status:      Spouse name: None    Number of children: None    Years of education: None    Highest education level: None   Occupational History    None   Tobacco Use    Smoking status: Never Smoker    Smokeless tobacco: Former User   Substance and Sexual Activity    Alcohol use: Yes     Comment: socially    Drug use: Never    Sexual activity: None   Other Topics Concern    None   Social History Narrative    None     Social Determinants of Health     Financial Resource Strain:     Difficulty of Paying Living Expenses:    Food Insecurity:     07/16/2021    MONOPCT 9.0 07/16/2021    BASOPCT 0.2 07/16/2021    MONOSABS 0.9 07/16/2021    LYMPHSABS 0.9 07/16/2021    EOSABS 0.0 07/16/2021    BASOSABS 0.0 07/16/2021     CMP:   Lab Results   Component Value Date     07/16/2021    K 4.2 07/16/2021     07/16/2021    CO2 25 07/16/2021    BUN 14 07/16/2021    CREATININE 0.59 (L) 07/16/2021    GLUCOSE 122 (H) 07/16/2021    CALCIUM 9.1 07/16/2021    PROT 7.6 07/16/2021    LABALBU 4.9 (H) 07/16/2021    BILITOT 0.6 07/16/2021    ALKPHOS 72 07/16/2021    AST 49 (H) 07/16/2021    ALT 45 (H) 07/16/2021    LABGLOM >60.0 07/16/2021    GFRAA >60.0 07/16/2021    GLOB 2.7 07/16/2021     Magnesium: No results found for: MG  Troponin:   Lab Results   Component Value Date    TROPONINI <0.010 07/16/2021     PT/INR:   Recent Labs     07/16/21  1030   PROTIME 13.3   INR 1.0     APTT: No results for input(s): APTT in the last 72 hours. EtOH: No results found for: ETOH    RADIOLOGY: (Personally reviewed)  CTH: acute arenchymal hemorrhagic contusion with associated subarachnoid component within the right frontal lobe. CXR: negative for acute traumatic injuries    XR Right Knee: negative for acute traumatic injuries    CT C-spine: No evidence of an acute fracture or dislocation. CT Chest: pending    CT L foot: pending      ASSESSMENT:  Mary Patton is a 45 y.o. male with PMHx of depression, HLD, HTN. Patient presents as direct transfer from [facility]. (+)head strike, (-)LOC, (-)AC/AP. GCS 15, neurovascularly intact, HDS. Denies chest pain while at rest and chest wall examination is benign. No headache, nausea, vomiting today. Trauma workup found acute parenchymal hemorrhagic contusion with associated SAH within the right frontal lobe. Patient transferred to [facility] EMERGENCY [facility] AT [place] and directly admitted to ICU under Trauma Service. NSGY consulted. PLAN:  1. Admit to ICU under Trauma Service.  -Neurological: NSGY consulted.  [address] in AM. Start Keppra x7 days. SLP cognitive eval. Pain control with tylenol scheduled and oxy 5mg q4hr prn. Home meds (paxil 10mg daily)  -Cardiovascular: No acute cardiac issues. Home meds (lisinopril 10mg daily). Start prn labetalol 10mg q4hr prn for SBP >160. Obtain troponin and EKG. -Respiratory: maintain O2 >90%. Encourage IS. CT chest pending. Physical exam benign and currently denying chest pain. -GI/Diet: regular diet. Start bowel regimen.  -Renal/Electrolytes: HLIV. AM BMP. Replenish electrolytes prn  -ID: No indication for abx  -Heme: No indication for transfusion. AM CBC. -Endocrine: No acute glycemic issues  -MSK: follow up left foot XR. PT/OT  -Prophylaxis: SCDs. Hold DVT ppx due to bleed risk.         Raiza Degroot PA-C  Trauma/Critical Care/General Surgery  [phone] ([id number])  [phone]     This patient's plan of care was discussed and made in collaboration with Trauma Attending physician, Rolando Madison MD.
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
## 2021-07-19 ENCOUNTER — TELEPHONE (OUTPATIENT)
Dept: SURGERY | Age: 51
End: 2021-07-19

## 2021-07-19 LAB
EKG ATRIAL RATE: 77 BPM
EKG P AXIS: 51 DEGREES
EKG P-R INTERVAL: 132 MS
EKG Q-T INTERVAL: 396 MS
EKG QRS DURATION: 100 MS
EKG QTC CALCULATION (BAZETT): 448 MS
EKG R AXIS: 62 DEGREES
EKG T AXIS: 51 DEGREES
EKG VENTRICULAR RATE: 77 BPM

## 2021-07-19 PROCEDURE — 93010 ELECTROCARDIOGRAM REPORT: CPT | Performed by: INTERNAL MEDICINE

## 2021-07-19 RX ORDER — CYCLOBENZAPRINE HCL 5 MG
5 TABLET ORAL 3 TIMES DAILY PRN
Qty: 15 TABLET | Refills: 0 | Status: SHIPPED | OUTPATIENT
Start: 2021-07-19 | End: 2021-07-24

## 2021-07-28 ENCOUNTER — OFFICE VISIT (OUTPATIENT)
Dept: NEUROSURGERY | Age: 51
End: 2021-07-28
Payer: COMMERCIAL

## 2021-07-28 VITALS — TEMPERATURE: 97.1 F | BODY MASS INDEX: 35 KG/M2 | WEIGHT: 250 LBS | HEIGHT: 71 IN

## 2021-07-28 DIAGNOSIS — I60.9 SUBARACHNOID BLEED (HCC): Primary | ICD-10-CM

## 2021-07-28 PROCEDURE — 1036F TOBACCO NON-USER: CPT | Performed by: NEUROLOGICAL SURGERY

## 2021-07-28 PROCEDURE — G8419 CALC BMI OUT NRM PARAM NOF/U: HCPCS | Performed by: NEUROLOGICAL SURGERY

## 2021-07-28 PROCEDURE — 99213 OFFICE O/P EST LOW 20 MIN: CPT | Performed by: NEUROLOGICAL SURGERY

## 2021-07-28 PROCEDURE — G8427 DOCREV CUR MEDS BY ELIG CLIN: HCPCS | Performed by: NEUROLOGICAL SURGERY

## 2021-07-28 PROCEDURE — 1111F DSCHRG MED/CURRENT MED MERGE: CPT | Performed by: NEUROLOGICAL SURGERY

## 2021-07-28 PROCEDURE — 3017F COLORECTAL CA SCREEN DOC REV: CPT | Performed by: NEUROLOGICAL SURGERY

## 2021-07-28 ASSESSMENT — ENCOUNTER SYMPTOMS
SHORTNESS OF BREATH: 0
CONSTIPATION: 0
NAUSEA: 0
DIARRHEA: 0
BACK PAIN: 0

## 2021-07-28 NOTE — PROGRESS NOTES
[facility] ([facility] Physicians  Neurosurgery and Pain [facility]  [address], [address] [facility] , [address] [facility], [facility] [address]: [phone]  F: [phone]       Abigail Messer  (1970)    7/28/2021    Referred by No ref. provider found    Subjective:     Abigail Messer is 45 y.o. male who complains today of:    Chief Complaint   Patient presents with    Back Pain    Shoulder Pain     in between shoulder blades    Knee Pain     He is here for follow up from hospital consult on 7-16-21 for subarachnoid hemmorhage. Feels better compared to last visit. Work status: Off work as a         Allergies:  Amoxicillin, Azithromycin, and Pcn [penicillins]    Past Medical History:   Diagnosis Date    Depression     Hyperlipidemia     Hypertension      Past Surgical History:   Procedure Laterality Date    SHOULDER SURGERY Left     VASECTOMY       No family history on file. Social History     Socioeconomic History    Marital status:      Spouse name: Not on file    Number of children: Not on file    Years of education: Not on file    Highest education level: Not on file   Occupational History    Not on file   Tobacco Use    Smoking status: Never Smoker    Smokeless tobacco: Former User   Substance and Sexual Activity    Alcohol use: Yes     Comment: socially    Drug use: Never    Sexual activity: Not on file   Other Topics Concern    Not on file   Social History Narrative    Not on file     Social Determinants of Health     Financial Resource Strain:     Difficulty of Paying Living Expenses:    Food Insecurity:     Worried About [address] in the Last Year:     [address] in the Last Year:    Transportation Needs:     Lack of Transportation (Medical):      Lack of Transportation (Non-Medical):    Physical Activity:     Days of Exercise per Week:     Minutes of Exercise per Session:    Stress:     Feeling of Stress :    Social Connections:  Frequency of Communication with Friends and Family:     Frequency of Social Gatherings with Friends and Family:     Attends [employer] Services:     Active Member of Clubs or Organizations:     Attends Club or Organization Meetings:     Marital Status:    Intimate Partner Violence:     Fear of Current or Ex-Partner:     Emotionally Abused:     Physically Abused:     Sexually Abused:        Current Outpatient Medications on File Prior to Visit   Medication Sig Dispense Refill    levETIRAcetam (KEPPRA) 750 MG tablet Take 1 tablet by mouth 2 times daily for 6 days 12 tablet 0    acetaminophen (TYLENOL) 325 MG tablet Take 2 tablets by mouth every 4 hours as needed for Pain 120 tablet 0    rosuvastatin (CRESTOR) 10 MG tablet Take 10 mg by mouth daily      PARoxetine (PAXIL) 10 MG tablet Take 10 mg by mouth every morning      lisinopril (PRINIVIL;ZESTRIL) 10 MG tablet Take 10 mg by mouth daily       No current facility-administered medications on file prior to visit. Review of Systems   Constitutional: Negative for fever. HENT: Negative for hearing loss. Respiratory: Negative for shortness of breath. Gastrointestinal: Negative for constipation, diarrhea and nausea. Genitourinary: Negative for difficulty urinating. Musculoskeletal: Negative for back pain and neck pain. Skin: Negative for rash. Neurological: Negative for headaches. Hematological: Does not bruise/bleed easily. Psychiatric/Behavioral: Negative for sleep disturbance. Objective:     Vitals:  Temp 97.1 °F (36.2 °C)   Ht 5' 11\" (1.803 m)   Wt 250 lb (113.4 kg)   BMI 34.87 kg/m² Pain Score:   5      Physical Exam  Constitutional:       Appearance: Normal appearance. HENT:      Head: Normocephalic and atraumatic. Mouth/Throat:      Mouth: Mucous membranes are moist.      Pharynx: Oropharynx is clear. Eyes:      Extraocular Movements: Extraocular movements intact.       Pupils: Pupils

## 2021-08-12 ENCOUNTER — HOSPITAL ENCOUNTER (OUTPATIENT)
Age: 51
End: 2021-08-12
Payer: COMMERCIAL

## 2021-08-12 VITALS — BODY MASS INDEX: 32.3 KG/M2

## 2021-08-12 DIAGNOSIS — E29.1: Primary | ICD-10-CM

## 2021-08-12 PROCEDURE — 84403 ASSAY OF TOTAL TESTOSTERONE: CPT

## 2021-08-12 PROCEDURE — 36415 COLL VENOUS BLD VENIPUNCTURE: CPT

## 2021-08-13 ENCOUNTER — HOSPITAL ENCOUNTER (OUTPATIENT)
Age: 51
End: 2021-08-13
Payer: COMMERCIAL

## 2021-08-13 DIAGNOSIS — E29.1: Primary | ICD-10-CM

## 2021-08-13 PROCEDURE — 84403 ASSAY OF TOTAL TESTOSTERONE: CPT

## 2021-08-13 PROCEDURE — 36415 COLL VENOUS BLD VENIPUNCTURE: CPT

## 2021-12-20 ENCOUNTER — OFFICE VISIT (OUTPATIENT)
Dept: INTERNAL MEDICINE | Age: 51
End: 2021-12-20
Payer: COMMERCIAL

## 2021-12-20 VITALS
HEART RATE: 97 BPM | SYSTOLIC BLOOD PRESSURE: 122 MMHG | BODY MASS INDEX: 34.58 KG/M2 | HEIGHT: 71 IN | DIASTOLIC BLOOD PRESSURE: 86 MMHG | WEIGHT: 247 LBS

## 2021-12-20 DIAGNOSIS — J06.9 ACUTE URI: ICD-10-CM

## 2021-12-20 DIAGNOSIS — R05.8 COUGH WITH EXPOSURE TO COVID-19 VIRUS: Primary | ICD-10-CM

## 2021-12-20 DIAGNOSIS — Z20.822 COUGH WITH EXPOSURE TO COVID-19 VIRUS: Primary | ICD-10-CM

## 2021-12-20 PROBLEM — E78.2 MIXED HYPERLIPIDEMIA: Status: ACTIVE | Noted: 2021-12-20

## 2021-12-20 PROBLEM — I10 ESSENTIAL HYPERTENSION: Status: ACTIVE | Noted: 2021-12-20

## 2021-12-20 LAB
Lab: NORMAL
PERFORMING INSTRUMENT: NORMAL
QC PASS/FAIL: NORMAL
S PYO AG THROAT QL: NORMAL
SARS-COV-2, POC: NORMAL

## 2021-12-20 PROCEDURE — 3017F COLORECTAL CA SCREEN DOC REV: CPT | Performed by: PHYSICIAN ASSISTANT

## 2021-12-20 PROCEDURE — 99203 OFFICE O/P NEW LOW 30 MIN: CPT | Performed by: PHYSICIAN ASSISTANT

## 2021-12-20 PROCEDURE — G8427 DOCREV CUR MEDS BY ELIG CLIN: HCPCS | Performed by: PHYSICIAN ASSISTANT

## 2021-12-20 PROCEDURE — 1036F TOBACCO NON-USER: CPT | Performed by: PHYSICIAN ASSISTANT

## 2021-12-20 PROCEDURE — 87880 STREP A ASSAY W/OPTIC: CPT | Performed by: PHYSICIAN ASSISTANT

## 2021-12-20 PROCEDURE — G8417 CALC BMI ABV UP PARAM F/U: HCPCS | Performed by: PHYSICIAN ASSISTANT

## 2021-12-20 PROCEDURE — 87426 SARSCOV CORONAVIRUS AG IA: CPT | Performed by: PHYSICIAN ASSISTANT

## 2021-12-20 PROCEDURE — G8484 FLU IMMUNIZE NO ADMIN: HCPCS | Performed by: PHYSICIAN ASSISTANT

## 2021-12-20 ASSESSMENT — ENCOUNTER SYMPTOMS
COUGH: 1
SORE THROAT: 1
SHORTNESS OF BREATH: 1
RHINORRHEA: 1
SINUS PRESSURE: 1
SINUS PAIN: 1

## 2021-12-20 ASSESSMENT — PATIENT HEALTH QUESTIONNAIRE - PHQ9
1. LITTLE INTEREST OR PLEASURE IN DOING THINGS: 0
SUM OF ALL RESPONSES TO PHQ QUESTIONS 1-9: 0
SUM OF ALL RESPONSES TO PHQ9 QUESTIONS 1 & 2: 0
SUM OF ALL RESPONSES TO PHQ QUESTIONS 1-9: 0
SUM OF ALL RESPONSES TO PHQ QUESTIONS 1-9: 0
2. FEELING DOWN, DEPRESSED OR HOPELESS: 0

## 2021-12-20 NOTE — PROGRESS NOTES
21    Linette Pa (: 1970) is a 45 y.o. male, New patient, here for evaluation of the following chief complaint(s):  Concern For COVID-19 (son tested pos )      HPI      Covid concerns, sinus congestion and cough  x 2 days   Son is positive   Rapid home testing was negative   Covid vaccines- Yes  Influenza vaccines - No  Known covid exposure- Yes  Fever- No  Loss of smell or taste -  No    Review of Systems   Constitutional: Positive for chills. Negative for fatigue and fever. HENT: Positive for congestion, rhinorrhea, sinus pressure, sinus pain and sore throat. Respiratory: Positive for cough and shortness of breath. Prior to Visit Medications    Medication Sig Taking?  Authorizing Provider   levETIRAcetam (KEPPRA) 750 MG tablet Take 1 tablet by mouth 2 times daily for 6 days  Aubrey Felipe PA-C   acetaminophen (TYLENOL) 325 MG tablet Take 2 tablets by mouth every 4 hours as needed for Pain  Aubrey Felipe PA-C   rosuvastatin (CRESTOR) 10 MG tablet Take 10 mg by mouth daily  Historical Provider, MD   PARoxetine (PAXIL) 10 MG tablet Take 10 mg by mouth every morning  Historical Provider, MD   lisinopril (PRINIVIL;ZESTRIL) 10 MG tablet Take 10 mg by mouth daily  Historical Provider, MD        Allergies   Allergen Reactions    Amoxicillin Hives    Azithromycin Nausea Only    Pcn [Penicillins] Rash       Social History     Socioeconomic History    Marital status:      Spouse name: Not on file    Number of children: Not on file    Years of education: Not on file    Highest education level: Not on file   Occupational History    Not on file   Tobacco Use    Smoking status: Never Smoker    Smokeless tobacco: Former User   Substance and Sexual Activity    Alcohol use: Yes     Comment: socially    Drug use: Never    Sexual activity: Not on file   Other Topics Concern    Not on file   Social History Narrative    Not on file     Social Determinants of back: Normal range of motion. Neurological:      Mental Status: He is alert. ASSESSMENT/PLAN:  1. Cough with exposure to COVID-19 virus  2. Acute URI  - POCT COVID-19, Antigen- negative   - Covid-19 Ambulatory; Future  - Culture, Throat; Future  - POCT rapid strep- negative  - will sent out culture and covid   - Self quarantine, only going out for 's appts/essentials  - OTC symptom control  - Continue to wear mask, social distance, and wash hands frequently  - Call 911 or go to the ER should symptoms become difficult to manage           No follow-ups on file. An  electronic signature was used to authenticate this note.     --LOYDA Monroe on 1/29/2021 at 12:12 PM

## 2021-12-21 ENCOUNTER — TELEPHONE (OUTPATIENT)
Dept: INTERNAL MEDICINE | Age: 51
End: 2021-12-21

## 2021-12-21 DIAGNOSIS — Z20.822 COUGH WITH EXPOSURE TO COVID-19 VIRUS: ICD-10-CM

## 2021-12-21 DIAGNOSIS — J06.9 ACUTE URI: ICD-10-CM

## 2021-12-21 DIAGNOSIS — R05.8 COUGH WITH EXPOSURE TO COVID-19 VIRUS: ICD-10-CM

## 2021-12-21 RX ORDER — CLINDAMYCIN HYDROCHLORIDE 300 MG/1
300 CAPSULE ORAL 2 TIMES DAILY
Qty: 14 CAPSULE | Refills: 0 | Status: SHIPPED | OUTPATIENT
Start: 2021-12-21 | End: 2021-12-28

## 2021-12-21 RX ORDER — METHYLPREDNISOLONE 4 MG/1
TABLET ORAL
Qty: 1 KIT | Refills: 0 | Status: SHIPPED | OUTPATIENT
Start: 2021-12-21 | End: 2021-12-27

## 2021-12-21 NOTE — TELEPHONE ENCOUNTER
Pt saw Tania Most. He called and asked if something is being prescribed for sinus infection?     Thank you

## 2021-12-21 NOTE — TELEPHONE ENCOUNTER
Pt called again to say that he was told by Abiola Patel that if her covid test was negative, she would send something to [facility] for his sinus infection.

## 2021-12-23 LAB
SARS-COV-2: NOT DETECTED
SOURCE: NORMAL

## 2021-12-24 LAB — THROAT CULTURE: NORMAL

## 2023-03-21 ENCOUNTER — OFFICE VISIT (OUTPATIENT)
Dept: FAMILY MEDICINE CLINIC | Age: 53
End: 2023-03-21
Payer: COMMERCIAL

## 2023-03-21 VITALS
SYSTOLIC BLOOD PRESSURE: 134 MMHG | TEMPERATURE: 98.3 F | WEIGHT: 250.4 LBS | DIASTOLIC BLOOD PRESSURE: 74 MMHG | OXYGEN SATURATION: 96 % | BODY MASS INDEX: 35.06 KG/M2 | HEIGHT: 71 IN | HEART RATE: 93 BPM

## 2023-03-21 DIAGNOSIS — B96.89 ACUTE BACTERIAL SINUSITIS: Primary | ICD-10-CM

## 2023-03-21 DIAGNOSIS — J01.90 ACUTE BACTERIAL SINUSITIS: Primary | ICD-10-CM

## 2023-03-21 PROCEDURE — 3074F SYST BP LT 130 MM HG: CPT | Performed by: PHYSICIAN ASSISTANT

## 2023-03-21 PROCEDURE — 99214 OFFICE O/P EST MOD 30 MIN: CPT | Performed by: PHYSICIAN ASSISTANT

## 2023-03-21 PROCEDURE — G8427 DOCREV CUR MEDS BY ELIG CLIN: HCPCS | Performed by: PHYSICIAN ASSISTANT

## 2023-03-21 PROCEDURE — 1036F TOBACCO NON-USER: CPT | Performed by: PHYSICIAN ASSISTANT

## 2023-03-21 PROCEDURE — 3017F COLORECTAL CA SCREEN DOC REV: CPT | Performed by: PHYSICIAN ASSISTANT

## 2023-03-21 PROCEDURE — 3078F DIAST BP <80 MM HG: CPT | Performed by: PHYSICIAN ASSISTANT

## 2023-03-21 PROCEDURE — G8484 FLU IMMUNIZE NO ADMIN: HCPCS | Performed by: PHYSICIAN ASSISTANT

## 2023-03-21 PROCEDURE — G8417 CALC BMI ABV UP PARAM F/U: HCPCS | Performed by: PHYSICIAN ASSISTANT

## 2023-03-21 RX ORDER — ALBUTEROL SULFATE 90 UG/1
2 AEROSOL, METERED RESPIRATORY (INHALATION) 4 TIMES DAILY PRN
Qty: 18 G | Refills: 5 | Status: SHIPPED | OUTPATIENT
Start: 2023-03-21

## 2023-03-21 RX ORDER — IPRATROPIUM BROMIDE 21 UG/1
SPRAY, METERED NASAL
COMMUNITY
Start: 2023-03-20

## 2023-03-21 RX ORDER — PAROXETINE HYDROCHLORIDE 20 MG/1
TABLET, FILM COATED ORAL
COMMUNITY

## 2023-03-21 RX ORDER — DOXYCYCLINE HYCLATE 100 MG
100 TABLET ORAL 2 TIMES DAILY
Qty: 10 TABLET | Refills: 0 | Status: SHIPPED | OUTPATIENT
Start: 2023-03-21 | End: 2023-03-26

## 2023-03-21 SDOH — ECONOMIC STABILITY: INCOME INSECURITY: HOW HARD IS IT FOR YOU TO PAY FOR THE VERY BASICS LIKE FOOD, HOUSING, MEDICAL CARE, AND HEATING?: NOT HARD AT ALL

## 2023-03-21 SDOH — ECONOMIC STABILITY: FOOD INSECURITY: WITHIN THE PAST 12 MONTHS, THE FOOD YOU BOUGHT JUST DIDN'T LAST AND YOU DIDN'T HAVE MONEY TO GET MORE.: NEVER TRUE

## 2023-03-21 SDOH — ECONOMIC STABILITY: HOUSING INSECURITY
IN THE LAST 12 MONTHS, WAS THERE A TIME WHEN YOU DID NOT HAVE A STEADY PLACE TO SLEEP OR SLEPT IN A SHELTER (INCLUDING NOW)?: NO

## 2023-03-21 SDOH — ECONOMIC STABILITY: FOOD INSECURITY: WITHIN THE PAST 12 MONTHS, YOU WORRIED THAT YOUR FOOD WOULD RUN OUT BEFORE YOU GOT MONEY TO BUY MORE.: NEVER TRUE

## 2023-03-21 ASSESSMENT — PATIENT HEALTH QUESTIONNAIRE - PHQ9
2. FEELING DOWN, DEPRESSED OR HOPELESS: 0
SUM OF ALL RESPONSES TO PHQ QUESTIONS 1-9: 0
1. LITTLE INTEREST OR PLEASURE IN DOING THINGS: 0
SUM OF ALL RESPONSES TO PHQ QUESTIONS 1-9: 0
SUM OF ALL RESPONSES TO PHQ9 QUESTIONS 1 & 2: 0

## 2023-03-21 NOTE — PROGRESS NOTES
Subjective:      Patient ID: Shannan George is a 47 y.o. male who presents today for:  Chief Complaint   Patient presents with    Congestion     X 1 week, slowly getting worse, cough, sinus pain/pressure, declines covid and flu test         Domenica seltzer plus  + productive cough w/greenish brown sputum, + HA, + sneezing and throat irritation  No CP or SOB        Past Medical History:   Diagnosis Date    Depression     Hyperlipidemia     Hypertension      Past Surgical History:   Procedure Laterality Date    SHOULDER SURGERY Left     VASECTOMY       No family history on file. Social History     Socioeconomic History    Marital status:      Spouse name: Not on file    Number of children: Not on file    Years of education: Not on file    Highest education level: Not on file   Occupational History    Not on file   Tobacco Use    Smoking status: Never    Smokeless tobacco: Former   Substance and Sexual Activity    Alcohol use: Yes     Comment: socially    Drug use: Never    Sexual activity: Not on file   Other Topics Concern    Not on file   Social History Narrative    Not on file     Social Determinants of Health     Financial Resource Strain: Low Risk     Difficulty of Paying Living Expenses: Not hard at all   Food Insecurity: No Food Insecurity    Worried About Running Out of Food in the Last Year: Never true    [address] in the Last Year: Never true   Transportation Needs: Unknown    Lack of Transportation (Medical): Not on file    Lack of Transportation (Non-Medical):  No   Physical Activity: Not on file   Stress: Not on file   Social Connections: Not on file   Intimate Partner Violence: Not on file   Housing Stability: Unknown    Unable to Pay for Housing in the Last Year: Not on file    Number of Places Lived in the Last Year: Not on file    Unstable Housing in the Last Year: No     Current Outpatient Medications on File Prior to Visit   Medication Sig Dispense Refill    ipratropium (ATROVENT) 0.03 %

## 2023-03-24 ENCOUNTER — HOSPITAL ENCOUNTER (OUTPATIENT)
Dept: HOSPITAL 100 - MFPLAB | Age: 53
Discharge: HOME | End: 2023-03-24
Payer: COMMERCIAL

## 2023-03-24 DIAGNOSIS — R53.83: ICD-10-CM

## 2023-03-24 DIAGNOSIS — Z13.1: ICD-10-CM

## 2023-03-24 DIAGNOSIS — R73.01: ICD-10-CM

## 2023-03-24 DIAGNOSIS — E55.9: ICD-10-CM

## 2023-03-24 DIAGNOSIS — Z13.220: Primary | ICD-10-CM

## 2023-03-24 DIAGNOSIS — Z12.5: ICD-10-CM

## 2023-03-24 LAB
ALANINE AMINOTRANSFER ALT/SGPT: 36 U/L (ref 16–61)
ALBUMIN SERPL-MCNC: 4.2 G/DL (ref 3.2–5)
ALKALINE PHOSPHATASE: 61 U/L (ref 45–117)
ANION GAP: 6 (ref 5–15)
AST(SGOT): 22 U/L (ref 15–37)
BUN SERPL-MCNC: 16 MG/DL (ref 7–18)
BUN/CREAT RATIO: 13.6 RATIO (ref 10–20)
CALCIUM SERPL-MCNC: 9.1 MG/DL (ref 8.5–10.1)
CARBON DIOXIDE: 28 MMOL/L (ref 21–32)
CHLORIDE: 106 MMOL/L (ref 98–107)
CHOLEST SERPL-MCNC: 189 MG/DL
EST GLOM FILT RATE - AFR AMER: 83 ML/MIN (ref 60–?)
GLOBULIN: 3.6 G/DL (ref 2.2–4.2)
GLUCOSE: 111 MG/DL (ref 74–106)
POTASSIUM: 4.2 MMOL/L (ref 3.5–5.1)
PSA,TOTAL - ANNUAL SCREEN: 0.51 NG/ML (ref 0–4)
TRIGLYCERIDES: 116 MG/DL
VLDLC SERPL-MCNC: 23 MG/DL (ref 5–40)

## 2023-03-24 PROCEDURE — 84443 ASSAY THYROID STIM HORMONE: CPT

## 2023-03-24 PROCEDURE — 84153 ASSAY OF PSA TOTAL: CPT

## 2023-03-24 PROCEDURE — 82652 VIT D 1 25-DIHYDROXY: CPT

## 2023-03-24 PROCEDURE — G0103 PSA SCREENING: HCPCS

## 2023-03-24 PROCEDURE — 80061 LIPID PANEL: CPT

## 2023-03-24 PROCEDURE — 84439 ASSAY OF FREE THYROXINE: CPT

## 2023-03-24 PROCEDURE — 80053 COMPREHEN METABOLIC PANEL: CPT

## 2023-03-24 PROCEDURE — 36415 COLL VENOUS BLD VENIPUNCTURE: CPT

## 2023-03-24 PROCEDURE — 83036 HEMOGLOBIN GLYCOSYLATED A1C: CPT

## 2023-03-27 LAB — 1,25(OH)2D3 SERPL-MCNC: 35 PG/ML (ref 24.8–81.5)

## 2023-04-08 ASSESSMENT — ENCOUNTER SYMPTOMS
SINUS PAIN: 1
COUGH: 1
SINUS PRESSURE: 1

## 2023-08-22 ENCOUNTER — HOSPITAL ENCOUNTER (EMERGENCY)
Age: 53
Discharge: HOME | End: 2023-08-22
Attending: EMERGENCY MEDICINE
Payer: COMMERCIAL

## 2023-08-22 VITALS
HEART RATE: 64 BPM | OXYGEN SATURATION: 94 % | WEIGHT: 250 LBS | RESPIRATION RATE: 18 BRPM | TEMPERATURE: 96.9 F | SYSTOLIC BLOOD PRESSURE: 147 MMHG | DIASTOLIC BLOOD PRESSURE: 85 MMHG | BODY MASS INDEX: 35 KG/M2 | HEIGHT: 71 IN

## 2023-08-22 DIAGNOSIS — T63.441A BEE STING REACTION, ACCIDENTAL OR UNINTENTIONAL, INITIAL ENCOUNTER: Primary | ICD-10-CM

## 2023-08-22 PROCEDURE — 6370000000 HC RX 637 (ALT 250 FOR IP): Performed by: EMERGENCY MEDICINE

## 2023-08-22 PROCEDURE — 99284 EMERGENCY DEPT VISIT MOD MDM: CPT

## 2023-08-22 PROCEDURE — 96372 THER/PROPH/DIAG INJ SC/IM: CPT

## 2023-08-22 PROCEDURE — 6360000002 HC RX W HCPCS: Performed by: EMERGENCY MEDICINE

## 2023-08-22 RX ORDER — DIPHENHYDRAMINE HCL 25 MG
25 CAPSULE ORAL
Status: COMPLETED | OUTPATIENT
Start: 2023-08-22 | End: 2023-08-22

## 2023-08-22 RX ORDER — PREDNISONE 20 MG/1
40 TABLET ORAL DAILY
Qty: 6 TABLET | Refills: 0 | Status: SHIPPED | OUTPATIENT
Start: 2023-08-22 | End: 2023-08-25

## 2023-08-22 RX ORDER — DEXAMETHASONE SODIUM PHOSPHATE 10 MG/ML
10 INJECTION, SOLUTION INTRAMUSCULAR; INTRAVENOUS ONCE
Status: COMPLETED | OUTPATIENT
Start: 2023-08-22 | End: 2023-08-22

## 2023-08-22 RX ADMIN — DIPHENHYDRAMINE HYDROCHLORIDE 25 MG: 25 CAPSULE ORAL at 08:23

## 2023-08-22 RX ADMIN — DEXAMETHASONE SODIUM PHOSPHATE 10 MG: 10 INJECTION, SOLUTION INTRAMUSCULAR; INTRAVENOUS at 08:19

## 2023-08-22 ASSESSMENT — PAIN DESCRIPTION - LOCATION: LOCATION: HEAD

## 2023-08-22 ASSESSMENT — PAIN DESCRIPTION - DESCRIPTORS: DESCRIPTORS: ACHING

## 2023-08-22 ASSESSMENT — PAIN DESCRIPTION - PAIN TYPE: TYPE: ACUTE PAIN

## 2023-08-22 ASSESSMENT — PAIN - FUNCTIONAL ASSESSMENT: PAIN_FUNCTIONAL_ASSESSMENT: 0-10

## 2023-08-22 ASSESSMENT — PAIN SCALES - GENERAL: PAINLEVEL_OUTOF10: 8

## 2023-08-22 ASSESSMENT — PAIN DESCRIPTION - ORIENTATION: ORIENTATION: RIGHT

## 2023-08-22 NOTE — ED TRIAGE NOTES
Patient stung yesterday by multiple yellow jackets. Right eye is swollen with no signs of distress.  His other stings are ok

## 2023-10-19 ENCOUNTER — HOSPITAL ENCOUNTER (OUTPATIENT)
Age: 53
Discharge: HOME | End: 2023-10-19
Payer: COMMERCIAL

## 2023-10-19 DIAGNOSIS — J18.9: Primary | ICD-10-CM

## 2023-10-19 LAB
DEPRECATED RDW RBC: 43.1 FL (ref 35.1–43.9)
DIFFERENTIAL COMMENT: (no result)
DIFFERENTIAL INDICATED: (no result)
ERYTHROCYTE [DISTWIDTH] IN BLOOD: 13.5 % (ref 11.6–14.6)
HCT VFR BLD AUTO: 44.6 % (ref 40–54)
HEMOGLOBIN: 14.3 G/DL (ref 13–16.5)
HGB BLD-MCNC: 14.3 G/DL (ref 13–16.5)
IMMATURE GRANULOCYTES COUNT: 0.02 X10^3/UL (ref 0–0)
MANUAL DIF COMMENT BLD-IMP: (no result)
MCV RBC: 86.6 FL (ref 80–94)
MEAN CORP HGB CONC: 32.1 G/DL (ref 32–36)
MEAN PLATELET VOL.: 10.2 FL (ref 6.2–12)
NRBC FLAGGED BY ANALYZER: 0 % (ref 0–5)
PLATELET # BLD: 157 K/MM3 (ref 150–450)
PLATELET COUNT: 157 K/MM3 (ref 150–450)
POSITIVE DIFFERENTIAL: YES
RBC # BLD AUTO: 5.15 M/MM3 (ref 4.6–6.2)
RBC DISTRIBUTION WIDTH CV: 13.5 % (ref 11.6–14.6)
RBC DISTRIBUTION WIDTH SD: 43.1 FL (ref 35.1–43.9)
RBC MORPH BLD: (no result) NORMAL
RED CELL MORPHOLOGY: (no result) NORMAL
SCAN SMEAR PER REVIEW CRITERIA: (no result)
WBC # BLD AUTO: 4.2 K/MM3 (ref 4.4–11)
WHITE BLOOD COUNT: 4.2 K/MM3 (ref 4.4–11)

## 2023-10-19 PROCEDURE — 36415 COLL VENOUS BLD VENIPUNCTURE: CPT

## 2023-10-19 PROCEDURE — 71046 X-RAY EXAM CHEST 2 VIEWS: CPT

## 2023-10-19 PROCEDURE — 85025 COMPLETE CBC W/AUTO DIFF WBC: CPT

## 2024-05-06 ENCOUNTER — HOSPITAL ENCOUNTER (OUTPATIENT)
Age: 54
Discharge: HOME | End: 2024-05-06
Payer: COMMERCIAL

## 2024-05-06 DIAGNOSIS — M17.0: ICD-10-CM

## 2024-05-06 DIAGNOSIS — M06.4: Primary | ICD-10-CM

## 2024-05-06 DIAGNOSIS — M19.041: ICD-10-CM

## 2024-05-06 DIAGNOSIS — M18.0: ICD-10-CM

## 2024-05-06 LAB
ALANINE AMINOTRANSFER ALT/SGPT: 39 U/L (ref 16–61)
ALBUMIN SERPL-MCNC: 4 G/DL (ref 3.2–5)
ALKALINE PHOSPHATASE: 56 U/L (ref 45–117)
ANION GAP: 5 (ref 5–15)
AST(SGOT): 31 U/L (ref 15–37)
BUN SERPL-MCNC: 15 MG/DL (ref 7–18)
BUN/CREAT RATIO: 14.3 RATIO (ref 10–20)
CALCIUM SERPL-MCNC: 8.7 MG/DL (ref 8.5–10.1)
CARBON DIOXIDE: 27 MMOL/L (ref 21–32)
CHLORIDE: 106 MMOL/L (ref 98–107)
CRP SERPL-MCNC: < 2.9 MG/L (ref 0–3)
DEPRECATED RDW RBC: 42.5 FL (ref 35.1–43.9)
ERYTHROCYTE [DISTWIDTH] IN BLOOD: 13.6 % (ref 11.6–14.6)
EST GLOM FILT RATE - AFR AMER: 94 ML/MIN (ref 60–?)
GLOBULIN: 3.2 G/DL (ref 2.2–4.2)
GLUCOSE: 106 MG/DL (ref 74–106)
HCT VFR BLD AUTO: 43.6 % (ref 40–54)
HGB BLD-MCNC: 14.3 G/DL (ref 13–16.5)
IMMATURE GRANULOCYTES COUNT: 0.01 X10^3/UL (ref 0–0)
MCV RBC: 85.3 FL (ref 80–94)
MEAN CORP HGB CONC: 32.8 G/DL (ref 32–36)
MEAN PLATELET VOL.: 9.8 FL (ref 6.2–12)
NRBC FLAGGED BY ANALYZER: 0 % (ref 0–5)
PLATELET # BLD: 166 K/MM3 (ref 150–450)
POTASSIUM: 4.3 MMOL/L (ref 3.5–5.1)
RBC # BLD AUTO: 5.11 M/MM3 (ref 4.6–6.2)
WBC # BLD AUTO: 4 K/MM3 (ref 4.4–11)

## 2024-05-06 PROCEDURE — 36415 COLL VENOUS BLD VENIPUNCTURE: CPT

## 2024-05-06 PROCEDURE — 86038 ANTINUCLEAR ANTIBODIES: CPT

## 2024-05-06 PROCEDURE — 86140 C-REACTIVE PROTEIN: CPT

## 2024-05-06 PROCEDURE — 85652 RBC SED RATE AUTOMATED: CPT

## 2024-05-06 PROCEDURE — 85025 COMPLETE CBC W/AUTO DIFF WBC: CPT

## 2024-05-06 PROCEDURE — 87340 HEPATITIS B SURFACE AG IA: CPT

## 2024-05-06 PROCEDURE — 86431 RHEUMATOID FACTOR QUANT: CPT

## 2024-05-06 PROCEDURE — 80053 COMPREHEN METABOLIC PANEL: CPT

## 2024-05-06 PROCEDURE — 86200 CCP ANTIBODY: CPT

## 2024-05-06 PROCEDURE — 86803 HEPATITIS C AB TEST: CPT

## 2024-05-06 PROCEDURE — 86706 HEP B SURFACE ANTIBODY: CPT

## 2024-08-13 ENCOUNTER — HOSPITAL ENCOUNTER (OUTPATIENT)
Dept: HOSPITAL 100 - MTLAB | Age: 54
Discharge: HOME | End: 2024-08-13
Payer: COMMERCIAL

## 2024-08-13 DIAGNOSIS — M19.041: ICD-10-CM

## 2024-08-13 DIAGNOSIS — Z79.899: ICD-10-CM

## 2024-08-13 DIAGNOSIS — M17.0: ICD-10-CM

## 2024-08-13 DIAGNOSIS — M18.0: ICD-10-CM

## 2024-08-13 DIAGNOSIS — M06.4: Primary | ICD-10-CM

## 2024-08-13 LAB
ALANINE AMINOTRANSFER ALT/SGPT: 35 U/L (ref 16–61)
ALBUMIN SERPL-MCNC: 3.9 G/DL (ref 3.2–5)
ALKALINE PHOSPHATASE: 59 U/L (ref 45–117)
ANION GAP: 7 (ref 5–15)
AST(SGOT): 24 U/L (ref 15–37)
BUN SERPL-MCNC: 14 MG/DL (ref 7–18)
BUN/CREAT RATIO: 12.8 RATIO (ref 10–20)
CALCIUM SERPL-MCNC: 8.6 MG/DL (ref 8.5–10.1)
CARBON DIOXIDE: 25 MMOL/L (ref 21–32)
CHLORIDE: 104 MMOL/L (ref 98–107)
DEPRECATED RDW RBC: 40.1 FL (ref 35.1–43.9)
ERYTHROCYTE [DISTWIDTH] IN BLOOD: 13 % (ref 11.6–14.6)
EST GLOM FILT RATE - AFR AMER: 90 ML/MIN (ref 60–?)
GLOBULIN: 3.2 G/DL (ref 2.2–4.2)
GLUCOSE: 104 MG/DL (ref 74–106)
HCT VFR BLD AUTO: 43.3 % (ref 40–54)
HEMOGLOBIN: 14.5 G/DL (ref 13–16.5)
HGB BLD-MCNC: 14.5 G/DL (ref 13–16.5)
IMMATURE GRANULOCYTES COUNT: 0.01 X10^3/UL (ref 0–0)
MCV RBC: 84.1 FL (ref 80–94)
MEAN CORP HGB CONC: 33.5 G/DL (ref 32–36)
MEAN PLATELET VOL.: 10.1 FL (ref 6.2–12)
NRBC FLAGGED BY ANALYZER: 0 % (ref 0–5)
PLATELET # BLD: 164 K/MM3 (ref 150–450)
PLATELET COUNT: 164 K/MM3 (ref 150–450)
POTASSIUM: 4.1 MMOL/L (ref 3.5–5.1)
RBC # BLD AUTO: 5.15 M/MM3 (ref 4.6–6.2)
RBC DISTRIBUTION WIDTH CV: 13 % (ref 11.6–14.6)
RBC DISTRIBUTION WIDTH SD: 40.1 FL (ref 35.1–43.9)
WBC # BLD AUTO: 5 K/MM3 (ref 4.4–11)
WHITE BLOOD COUNT: 5 K/MM3 (ref 4.4–11)

## 2024-08-13 PROCEDURE — 80053 COMPREHEN METABOLIC PANEL: CPT

## 2024-08-13 PROCEDURE — 85025 COMPLETE CBC W/AUTO DIFF WBC: CPT

## 2024-08-13 PROCEDURE — 36415 COLL VENOUS BLD VENIPUNCTURE: CPT

## 2024-11-14 ENCOUNTER — HOSPITAL ENCOUNTER (OUTPATIENT)
Age: 54
Discharge: HOME | End: 2024-11-14
Payer: COMMERCIAL

## 2024-11-14 DIAGNOSIS — M19.041: ICD-10-CM

## 2024-11-14 DIAGNOSIS — M18.0: ICD-10-CM

## 2024-11-14 DIAGNOSIS — Z79.899: ICD-10-CM

## 2024-11-14 DIAGNOSIS — M06.4: Primary | ICD-10-CM

## 2024-11-14 DIAGNOSIS — M17.0: ICD-10-CM

## 2024-11-14 LAB
ALANINE AMINOTRANSFER ALT/SGPT: 40 U/L (ref 16–61)
ALBUMIN SERPL-MCNC: 4 G/DL (ref 3.2–5)
ALKALINE PHOSPHATASE: 54 U/L (ref 45–117)
ANION GAP: 5 (ref 5–15)
AST(SGOT): 18 U/L (ref 15–37)
BUN SERPL-MCNC: 11 MG/DL (ref 7–18)
BUN/CREAT RATIO: 11.2 RATIO (ref 10–20)
CALCIUM SERPL-MCNC: 8.8 MG/DL (ref 8.5–10.1)
CARBON DIOXIDE: 28 MMOL/L (ref 21–32)
CHLORIDE: 106 MMOL/L (ref 98–107)
DEPRECATED RDW RBC: 40.1 FL (ref 35.1–43.9)
ERYTHROCYTE [DISTWIDTH] IN BLOOD: 12.9 % (ref 11.6–14.6)
EST GLOM FILT RATE - AFR AMER: 102 ML/MIN (ref 60–?)
GLOBULIN: 3 G/DL (ref 2.2–4.2)
GLUCOSE: 93 MG/DL (ref 74–106)
HCT VFR BLD AUTO: 43 % (ref 40–54)
HEMOGLOBIN: 14 G/DL (ref 13–16.5)
HGB BLD-MCNC: 14 G/DL (ref 13–16.5)
IMMATURE GRANULOCYTES COUNT: 0.01 X10^3/UL (ref 0–0)
MCV RBC: 85.5 FL (ref 80–94)
MEAN CORP HGB CONC: 32.6 G/DL (ref 32–36)
MEAN PLATELET VOL.: 9.5 FL (ref 6.2–12)
NRBC FLAGGED BY ANALYZER: 0 % (ref 0–5)
PLATELET # BLD: 192 K/MM3 (ref 150–450)
PLATELET COUNT: 192 K/MM3 (ref 150–450)
POTASSIUM: 4.1 MMOL/L (ref 3.5–5.1)
RBC # BLD AUTO: 5.03 M/MM3 (ref 4.6–6.2)
RBC DISTRIBUTION WIDTH CV: 12.9 % (ref 11.6–14.6)
RBC DISTRIBUTION WIDTH SD: 40.1 FL (ref 35.1–43.9)
WBC # BLD AUTO: 5.6 K/MM3 (ref 4.4–11)
WHITE BLOOD COUNT: 5.6 K/MM3 (ref 4.4–11)

## 2024-11-14 PROCEDURE — 80053 COMPREHEN METABOLIC PANEL: CPT

## 2024-11-14 PROCEDURE — 85025 COMPLETE CBC W/AUTO DIFF WBC: CPT

## 2024-11-14 PROCEDURE — 36415 COLL VENOUS BLD VENIPUNCTURE: CPT

## 2024-12-28 NOTE — TELEPHONE ENCOUNTER
Infectious Disease    HPI:  Mr Watkins is a 66 year old male with subacute illness/URI sxs more recently with progressive dyspnea then fever.  ID is following patient for pneumonia    12/28/2024 - Worsened O2 needs today.   No fevers.  Bringing up some sputum now.  Feels about the same.        ROS:  As above, remainder of review unremarkable.    Current Medications  Current Facility-Administered Medications   Medication Dose Route Frequency Provider Last Rate Last Admin    methylPREDNISolone (SOLU-Medrol) injection 40 mg  40 mg Intravenous 3 times per day Dell Patricia MD        guaiFENesin (MUCINEX) ER tablet 1,200 mg  1,200 mg Oral 2 times per day Dell Patricia MD   1,200 mg at 12/28/24 0916    acetylcysteine (MUCOMYST) 20 % nebulizer solution 400 mg  400 mg Nebulization Q6H Resp Dell Patricia MD        loratadine (CLARITIN) tablet 10 mg  10 mg Oral QAM AC Dell Patricia MD        vancomycin 1,500 mg in sodium chloride 0.9% 250 mL IVPB  1,500 mg Intravenous 2 times per day Marek Marin DO   Completed at 12/28/24 1020    pantoprazole (PROTONIX) EC tablet 40 mg  40 mg Oral QAM AC Dell Patricia MD   40 mg at 12/28/24 0534    metroNIDAZOLE (FLAGYL) tablet 500 mg  500 mg Oral 2 times per day Cecil Richardson PA-C   500 mg at 12/28/24 0751    CARBOXYMethylcellulose (REFRESH TEARS) 0.5 % ophthalmic solution 2 drop  2 drop Both Eyes TID Dell Patricia MD   2 drop at 12/28/24 0755    sodium chloride 0.9 % injection 2 mL  2 mL Intracatheter 2 times per day Doug Amin MD   2 mL at 12/28/24 0754    ceFEPIme (MAXIPIME) 2,000 mg in sodium chloride 0.9 % 100 mL IVPB  2,000 mg Intravenous 3 times per day Dell Patricia MD   Completed at 12/28/24 1020    VANCOMYCIN - PHARMACIST MONITORED Misc   Does not apply See Admin Instructions Isidro Allen DO        acyclovir (ZOVIRAX) tablet 800 mg  800 mg Oral BID Isidro Allen DO   800 mg at 12/28/24 0752    flecainide (TAMBOCOR) tablet 150 mg  150 mg Oral BID  Pt aware of medications being sent Isidro Allen, DO   150 mg at 12/28/24 0752    rivaroxaban (XARELTO) tablet 20 mg  20 mg Oral Daily with dinner Isidro Allen, DO   20 mg at 12/27/24 1820     Allergies  ALLERGIES:   Allergen Reactions    Ibuprofen SWELLING and Other (See Comments)     Puffiness in the face   9/13/2022 - pt reports having medication while in hospital a few years ago and has not had a reaction     Immune Globulin Other (See Comments)     Rigors after he received one third of 400 mg/kg dose.  Resolved with Solu-Medrol, but had a delayed reaction.    Penicillins      As a child he was told     Sulfa Antibiotics RASH     Headache     Physical Exam  Visit Vitals  /79 (BP Location: RUE - Right upper extremity, Patient Position: Sitting)   Pulse 68   Temp 99.1 °F (37.3 °C) (Oral)   Resp 18   Ht 5' 10\" (1.778 m)   Wt 69.1 kg (152 lb 5.4 oz)   SpO2 91%   BMI 21.86 kg/m²     Temp:  [98.4 °F (36.9 °C)-99.1 °F (37.3 °C)] 99.1 °F (37.3 °C)  Heart Rate:  [55-69] 68  Resp:  [16-18] 18  BP: (115-137)/(72-79) 137/79  I/O last 3 completed shifts:  In: 2713.2 [P.O.:360; I.V.:11.9; IV Piggyback:2341.3]  Out: -   No intake/output data recorded.    General:  A 66 year old male in NAD, sitting up in chair.  Looks fatigued    HEENT:  NCAT, sclera without icterus, no conjunctival injection. External ears/nares normal.  No oral lesions  Neck:  Supple   Cardiac:  RRR with S1, S2.  No murmur appreciated.    Lungs:  R>L basilar rales on inspiration, no wheeze.  On oxymask now   Abdomen:  Soft, no tenderness to palpation. No rebound or guarding.    Extremities:  no peripheral edema.  No joint effusions noted. No cyanosis.   Integumentary:  No rash noted throughout   Neuro:  Appears to be alert and oriented, grossly non-focal exam with equal limb movement bilaterally  Psych:  Answers questions appropriately, pleasant and cooperative with normal affect  Lines:  PIV ok    Labs  Recent Labs   Lab 12/28/24  0525 12/27/24  0533 12/26/24  0602   WBC 8.0 8.6 6.1    RBC 3.64* 3.61* 3.84*   HGB 12.4* 12.2* 12.9*   HCT 36.1* 36.1* 38.9*    185 191     Recent Labs   Lab 24  0525 24  0533 24  0602   SODIUM 139 138 137   CHLORIDE 103 103 104   CO2 29 27 26   BUN 24* 21* 21*   CREATININE 0.90 0.85 0.84   CALCIUM 8.7 8.3* 8.9   ALBUMIN 2.3* 2.1* 2.3*   BILIRUBIN 0.4 0.3 0.3   ALKPT 134* 146* 135*   GPT 71* 71* 82*   AST 36 37 40*   GLUCOSE 102* 111* 149*       Micro Data:   BCX - ngtd   MRSA nares - neg   resp path panel - + rhinovirus again (had 12/15 as well)    sputum cx - mod normal leno    Imagin/23 CT chest-    IMPRESSION:    1. Interval progression of diffuse centrilobular micronodularity within  bilateral lung bases, right middle lobe, and inferior aspect of the right  upper lobe with interval progression of airspace consolidations in  bilateral lower lobes and right middle lobe. The findings again raise the  possibility for an infectious/inflammatory process, possibly an atypical  infectious/inflammatory process.     2. Stable osseous lesions, likely the patient's history of myeloma. Stable  compression deformities without acute compression deformity.    Complex data reviewed on 2024 :  CBC, CMP today  Cultures:  BCX,  resp path panel  Notes reviewed: hospitalist notes   Management d/w     Assessment  Frederick Watkins is a 66 year old male we are consulted for management of the followin. Fever with ongoing cough, dyspnea    - given courses of abx he has been on, ? MRSA post viral (though nares PCR neg) vs a pseudomonas although he does not recall improvement while on levaquin.  He did feel better on vanco / cefepime regimen as inpatient however  - rhinovirus + on resp panel.  GPC on sputum gram stain   - leading concern given course and his drug therapy is Carfilzomib induced pneumonitis given association.  Diagnosis of exclusion, so also will evaluate vasculitis but thus far MPO and P3 neg along  with ERICA, normal complements.  - on steroids now for potential drug induced pneumonitis but O2 needs worse today.     2.  Hx of multiple myeloma - on bactrim and acyclovir PPx  - on daratumumab/carfilzomib as per Katie    3.  Paroxysmal a-fib - on flecainide, xarelto  - should avoid flouroquinolone abx while on flecainide    4,  IVIG administration reaction - given solumedrol 12/25 after reaction.  Now on 2 L/min.   - IgA, IgM all low, IgG in 460's.     5.  Sinusitis + dental abscess - no micro, patient notes some L upper molar pain since root canal months ago, too costly to have tooth removed back then  - no active drainage.     Recommendations  - continue present abx for sinusitis / dental infection  - add on fungal urine agn   - steroids started as empiric Rx for presumed drug related pneumonitis  - follow O2 needs closely, if worsening needs repeat chest CT and pulmonary consultation   - vasculitis work up so far neg with normal complements and MPO and P3.      Supervising physician:  Dr Cabrera, case discussed     Tesha Riggins PA-C   12/28/2024

## 2025-01-07 ENCOUNTER — HOSPITAL ENCOUNTER (OUTPATIENT)
Dept: HOSPITAL 100 - MTLAB | Age: 55
Discharge: HOME | End: 2025-01-07
Payer: COMMERCIAL

## 2025-01-07 DIAGNOSIS — M06.4: Primary | ICD-10-CM

## 2025-01-07 DIAGNOSIS — M17.0: ICD-10-CM

## 2025-01-07 DIAGNOSIS — Z79.899: ICD-10-CM

## 2025-01-07 DIAGNOSIS — M18.0: ICD-10-CM

## 2025-01-07 DIAGNOSIS — M19.041: ICD-10-CM

## 2025-01-07 LAB
ALANINE AMINOTRANSFER ALT/SGPT: 29 U/L (ref 16–61)
ALBUMIN SERPL-MCNC: 4.1 G/DL (ref 3.2–5)
ALKALINE PHOSPHATASE: 59 U/L (ref 45–117)
ANION GAP: 5 (ref 5–15)
AST(SGOT): 13 U/L (ref 15–37)
BUN SERPL-MCNC: 11 MG/DL (ref 7–18)
BUN/CREAT RATIO: 10.6 RATIO (ref 10–20)
CALCIUM SERPL-MCNC: 8.9 MG/DL (ref 8.5–10.1)
CARBON DIOXIDE: 29 MMOL/L (ref 21–32)
CHLORIDE: 104 MMOL/L (ref 98–107)
DEPRECATED RDW RBC: 40.4 FL (ref 35.1–43.9)
ERYTHROCYTE [DISTWIDTH] IN BLOOD: 13.2 % (ref 11.6–14.6)
EST GLOM FILT RATE - AFR AMER: 95 ML/MIN (ref 60–?)
GLOBULIN: 3.1 G/DL (ref 2.2–4.2)
GLUCOSE: 87 MG/DL (ref 74–106)
HCT VFR BLD AUTO: 41.2 % (ref 40–54)
HEMOGLOBIN: 13.5 G/DL (ref 13–16.5)
HGB BLD-MCNC: 13.5 G/DL (ref 13–16.5)
IMMATURE GRANULOCYTES COUNT: 0.01 X10^3/UL (ref 0–0)
MCV RBC: 84.9 FL (ref 80–94)
MEAN CORP HGB CONC: 32.8 G/DL (ref 32–36)
MEAN PLATELET VOL.: 9.4 FL (ref 6.2–12)
NRBC FLAGGED BY ANALYZER: 0 % (ref 0–5)
PLATELET # BLD: 177 K/MM3 (ref 150–450)
PLATELET COUNT: 177 K/MM3 (ref 150–450)
POTASSIUM: 4 MMOL/L (ref 3.5–5.1)
RBC # BLD AUTO: 4.85 M/MM3 (ref 4.6–6.2)
RBC DISTRIBUTION WIDTH CV: 13.2 % (ref 11.6–14.6)
RBC DISTRIBUTION WIDTH SD: 40.4 FL (ref 35.1–43.9)
WBC # BLD AUTO: 5.8 K/MM3 (ref 4.4–11)
WHITE BLOOD COUNT: 5.8 K/MM3 (ref 4.4–11)

## 2025-01-07 PROCEDURE — 36415 COLL VENOUS BLD VENIPUNCTURE: CPT

## 2025-01-07 PROCEDURE — 80053 COMPREHEN METABOLIC PANEL: CPT

## 2025-01-07 PROCEDURE — 85025 COMPLETE CBC W/AUTO DIFF WBC: CPT

## 2025-02-18 ENCOUNTER — HOSPITAL ENCOUNTER (OUTPATIENT)
Dept: HOSPITAL 100 - MTLAB | Age: 55
Discharge: HOME | End: 2025-02-18
Payer: COMMERCIAL

## 2025-02-18 DIAGNOSIS — Z00.00: Primary | ICD-10-CM

## 2025-02-18 LAB
ANION GAP: 4 (ref 5–15)
BUN SERPL-MCNC: 13 MG/DL (ref 7–18)
BUN/CREAT RATIO: 12.4 RATIO (ref 10–20)
CALCIUM SERPL-MCNC: 9.7 MG/DL (ref 8.5–10.1)
CARBON DIOXIDE: 29 MMOL/L (ref 21–32)
CHLORIDE: 107 MMOL/L (ref 98–107)
CHOLEST SERPL-MCNC: 126 MG/DL
CREAT SERPL-MCNC: 1.05 MG/DL (ref 0.7–1.3)
EST GLOM FILT RATE - AFR AMER: 94 ML/MIN (ref 60–?)
GLUCOSE: 95 MG/DL (ref 74–106)
POTASSIUM: 4.3 MMOL/L (ref 3.5–5.1)
PSA,TOTAL- DIAGNOSTIC: 0.28 NG/ML (ref 0–4)
TRIGLYCERIDES: 102 MG/DL
VLDLC SERPL-MCNC: 20 MG/DL (ref 5–40)

## 2025-02-18 PROCEDURE — 80061 LIPID PANEL: CPT

## 2025-02-18 PROCEDURE — 84153 ASSAY OF PSA TOTAL: CPT

## 2025-02-18 PROCEDURE — 83036 HEMOGLOBIN GLYCOSYLATED A1C: CPT

## 2025-02-18 PROCEDURE — 80048 BASIC METABOLIC PNL TOTAL CA: CPT

## 2025-02-18 PROCEDURE — 36415 COLL VENOUS BLD VENIPUNCTURE: CPT

## 2025-03-08 ENCOUNTER — OFFICE VISIT (OUTPATIENT)
Dept: FAMILY MEDICINE CLINIC | Age: 55
End: 2025-03-08
Payer: COMMERCIAL

## 2025-03-08 VITALS
OXYGEN SATURATION: 96 % | BODY MASS INDEX: 32.53 KG/M2 | DIASTOLIC BLOOD PRESSURE: 72 MMHG | TEMPERATURE: 98.9 F | HEIGHT: 71 IN | SYSTOLIC BLOOD PRESSURE: 118 MMHG | HEART RATE: 92 BPM | WEIGHT: 232.4 LBS | RESPIRATION RATE: 16 BRPM

## 2025-03-08 DIAGNOSIS — J01.90 ACUTE NON-RECURRENT SINUSITIS, UNSPECIFIED LOCATION: Primary | ICD-10-CM

## 2025-03-08 PROCEDURE — G8427 DOCREV CUR MEDS BY ELIG CLIN: HCPCS | Performed by: NURSE PRACTITIONER

## 2025-03-08 PROCEDURE — 99213 OFFICE O/P EST LOW 20 MIN: CPT | Performed by: NURSE PRACTITIONER

## 2025-03-08 PROCEDURE — 3078F DIAST BP <80 MM HG: CPT | Performed by: NURSE PRACTITIONER

## 2025-03-08 PROCEDURE — 3017F COLORECTAL CA SCREEN DOC REV: CPT | Performed by: NURSE PRACTITIONER

## 2025-03-08 PROCEDURE — G8417 CALC BMI ABV UP PARAM F/U: HCPCS | Performed by: NURSE PRACTITIONER

## 2025-03-08 PROCEDURE — 3074F SYST BP LT 130 MM HG: CPT | Performed by: NURSE PRACTITIONER

## 2025-03-08 PROCEDURE — 1036F TOBACCO NON-USER: CPT | Performed by: NURSE PRACTITIONER

## 2025-03-08 RX ORDER — IBUPROFEN 600 MG/1
TABLET, FILM COATED ORAL
COMMUNITY
Start: 2025-03-04 | End: 2025-03-08

## 2025-03-08 RX ORDER — METHYLPREDNISOLONE 4 MG/1
TABLET ORAL
COMMUNITY
Start: 2025-03-04 | End: 2025-03-08

## 2025-03-08 RX ORDER — FOLIC ACID 1 MG/1
2000 TABLET ORAL DAILY
COMMUNITY
Start: 2025-01-07 | End: 2025-03-08

## 2025-03-08 RX ORDER — METHOTREXATE 2.5 MG/1
TABLET ORAL
COMMUNITY
Start: 2025-01-27

## 2025-03-08 RX ORDER — DOXYCYCLINE HYCLATE 100 MG
100 TABLET ORAL 2 TIMES DAILY
Qty: 14 TABLET | Refills: 0 | Status: SHIPPED | OUTPATIENT
Start: 2025-03-08 | End: 2025-03-15

## 2025-03-08 RX ORDER — FOLIC ACID 1 MG/1
1 TABLET ORAL DAILY
COMMUNITY

## 2025-03-08 ASSESSMENT — ENCOUNTER SYMPTOMS
NAUSEA: 0
SORE THROAT: 0
SHORTNESS OF BREATH: 0
DIARRHEA: 0
SINUS PAIN: 1
SWOLLEN GLANDS: 1
COUGH: 1
ABDOMINAL PAIN: 0
VOMITING: 0
RHINORRHEA: 0
WHEEZING: 0
SINUS PRESSURE: 1

## 2025-03-08 ASSESSMENT — PATIENT HEALTH QUESTIONNAIRE - PHQ9
1. LITTLE INTEREST OR PLEASURE IN DOING THINGS: NOT AT ALL
SUM OF ALL RESPONSES TO PHQ QUESTIONS 1-9: 0
SUM OF ALL RESPONSES TO PHQ QUESTIONS 1-9: 0
2. FEELING DOWN, DEPRESSED OR HOPELESS: NOT AT ALL
SUM OF ALL RESPONSES TO PHQ QUESTIONS 1-9: 0
SUM OF ALL RESPONSES TO PHQ QUESTIONS 1-9: 0

## 2025-06-04 ENCOUNTER — HOSPITAL ENCOUNTER (OUTPATIENT)
Age: 55
Discharge: HOME | End: 2025-06-04
Payer: COMMERCIAL

## 2025-06-04 DIAGNOSIS — J01.90: Primary | ICD-10-CM

## 2025-06-04 PROCEDURE — 70486 CT MAXILLOFACIAL W/O DYE: CPT

## 2025-07-28 ENCOUNTER — HOSPITAL ENCOUNTER (OUTPATIENT)
Age: 55
Discharge: HOME | End: 2025-07-28
Payer: COMMERCIAL

## 2025-07-28 DIAGNOSIS — Z01.812: Primary | ICD-10-CM

## 2025-07-28 DIAGNOSIS — Z01.810: ICD-10-CM

## 2025-07-28 LAB
ANION GAP SERPL CALC-SCNC: 10 MMOL/L (ref 5–15)
BUN SERPL-MCNC: 13 MG/DL (ref 4–19)
BUN/CREAT SERPL: 13.7 RATIO (ref 10–20)
CALCIUM SERPL-MCNC: 8.9 MG/DL (ref 7.6–11)
CHLORIDE: 104 MMOL/L (ref 98–108)
CO2 BLDCV-SCNC: 25.3 MMOL/L (ref 21–32)
CREAT SERPL-MCNC: 0.97 MG/DL (ref 0.7–1.2)
DEPRECATED RDW RBC: 39.6 FL (ref 35.1–43.9)
ERYTHROCYTE [DISTWIDTH] IN BLOOD: 13.1 % (ref 11.6–14.6)
GLUCOSE SERPL-MCNC: 101 MG/DL (ref 70–99)
HCT VFR BLD AUTO: 42.1 % (ref 40–54)
HGB BLD-MCNC: 14.1 G/DL (ref 13–16.5)
MCH RBC QN AUTO: 27.8 PG (ref 27–32)
MCV RBC: 82.9 FL (ref 80–94)
MEAN CORP HGB CONC: 33.5 G/DL (ref 32–36)
MEAN PLATELET VOL.: 9.3 FL (ref 6.2–12)
PLATELET # BLD: 167 K/MM3 (ref 150–450)
POTASSIUM SPEC-MCNC: 4 MMOL/L (ref 3.3–5.1)
RBC # BLD AUTO: 5.08 M/MM3 (ref 4.6–6.2)
SODIUM LEVEL: 140 MMOL/L (ref 133–145)
WBC # BLD AUTO: 5.9 K/MM3 (ref 4.4–11)
WBC NRBC COR # BLD: (no result) K/MM3 (ref 4.4–11)

## 2025-07-28 PROCEDURE — 36415 COLL VENOUS BLD VENIPUNCTURE: CPT

## 2025-07-28 PROCEDURE — 85027 COMPLETE CBC AUTOMATED: CPT

## 2025-07-28 PROCEDURE — 80048 BASIC METABOLIC PNL TOTAL CA: CPT

## 2025-07-28 PROCEDURE — 93005 ELECTROCARDIOGRAM TRACING: CPT
